# Patient Record
Sex: MALE | Race: OTHER | HISPANIC OR LATINO | URBAN - METROPOLITAN AREA
[De-identification: names, ages, dates, MRNs, and addresses within clinical notes are randomized per-mention and may not be internally consistent; named-entity substitution may affect disease eponyms.]

---

## 2021-10-20 ENCOUNTER — EMERGENCY (EMERGENCY)
Facility: HOSPITAL | Age: 61
LOS: 1 days | Discharge: HOME | End: 2021-10-20
Attending: EMERGENCY MEDICINE
Payer: COMMERCIAL

## 2021-10-20 VITALS
RESPIRATION RATE: 18 BRPM | TEMPERATURE: 99 F | SYSTOLIC BLOOD PRESSURE: 150 MMHG | HEART RATE: 65 BPM | OXYGEN SATURATION: 100 % | DIASTOLIC BLOOD PRESSURE: 75 MMHG

## 2021-10-20 VITALS
HEIGHT: 63 IN | RESPIRATION RATE: 18 BRPM | SYSTOLIC BLOOD PRESSURE: 160 MMHG | TEMPERATURE: 98 F | WEIGHT: 154.98 LBS | OXYGEN SATURATION: 98 % | HEART RATE: 88 BPM | DIASTOLIC BLOOD PRESSURE: 92 MMHG

## 2021-10-20 LAB
ALBUMIN SERPL ELPH-MCNC: 4.3 G/DL — SIGNIFICANT CHANGE UP (ref 3.3–5.2)
ALP SERPL-CCNC: 175 U/L — HIGH (ref 40–120)
ALT FLD-CCNC: SIGNIFICANT CHANGE UP U/L
ANION GAP SERPL CALC-SCNC: 14 MMOL/L — SIGNIFICANT CHANGE UP (ref 5–17)
AST SERPL-CCNC: SIGNIFICANT CHANGE UP U/L
BILIRUB SERPL-MCNC: 0.6 MG/DL — SIGNIFICANT CHANGE UP (ref 0.4–2)
BUN SERPL-MCNC: 17.1 MG/DL — SIGNIFICANT CHANGE UP (ref 8–20)
CALCIUM SERPL-MCNC: 9 MG/DL — SIGNIFICANT CHANGE UP (ref 8.6–10.2)
CHLORIDE SERPL-SCNC: 93 MMOL/L — LOW (ref 98–107)
CO2 SERPL-SCNC: 24 MMOL/L — SIGNIFICANT CHANGE UP (ref 22–29)
CREAT SERPL-MCNC: 0.42 MG/DL — LOW (ref 0.5–1.3)
GLUCOSE SERPL-MCNC: 409 MG/DL — HIGH (ref 70–99)
HCT VFR BLD CALC: 40.5 % — SIGNIFICANT CHANGE UP (ref 39–50)
HGB BLD-MCNC: 14.8 G/DL — SIGNIFICANT CHANGE UP (ref 13–17)
MCHC RBC-ENTMCNC: 28.4 PG — SIGNIFICANT CHANGE UP (ref 27–34)
MCHC RBC-ENTMCNC: 36.5 GM/DL — HIGH (ref 32–36)
MCV RBC AUTO: 77.6 FL — LOW (ref 80–100)
PLATELET # BLD AUTO: 138 K/UL — LOW (ref 150–400)
POTASSIUM SERPL-MCNC: 4.7 MMOL/L — SIGNIFICANT CHANGE UP (ref 3.5–5.3)
POTASSIUM SERPL-SCNC: 4.7 MMOL/L — SIGNIFICANT CHANGE UP (ref 3.5–5.3)
PROT SERPL-MCNC: 7.1 G/DL — SIGNIFICANT CHANGE UP (ref 6.6–8.7)
RBC # BLD: 5.22 M/UL — SIGNIFICANT CHANGE UP (ref 4.2–5.8)
RBC # FLD: 14.6 % — HIGH (ref 10.3–14.5)
SODIUM SERPL-SCNC: 131 MMOL/L — LOW (ref 135–145)
WBC # BLD: 6.67 K/UL — SIGNIFICANT CHANGE UP (ref 3.8–10.5)
WBC # FLD AUTO: 6.67 K/UL — SIGNIFICANT CHANGE UP (ref 3.8–10.5)

## 2021-10-20 PROCEDURE — 99284 EMERGENCY DEPT VISIT MOD MDM: CPT

## 2021-10-20 PROCEDURE — 36415 COLL VENOUS BLD VENIPUNCTURE: CPT

## 2021-10-20 PROCEDURE — T1013: CPT

## 2021-10-20 PROCEDURE — 80053 COMPREHEN METABOLIC PANEL: CPT

## 2021-10-20 PROCEDURE — 85027 COMPLETE CBC AUTOMATED: CPT

## 2021-10-20 PROCEDURE — 82962 GLUCOSE BLOOD TEST: CPT

## 2021-10-20 RX ORDER — SODIUM CHLORIDE 9 MG/ML
2000 INJECTION INTRAMUSCULAR; INTRAVENOUS; SUBCUTANEOUS ONCE
Refills: 0 | Status: DISCONTINUED | OUTPATIENT
Start: 2021-10-20 | End: 2021-10-20

## 2021-10-20 RX ORDER — METFORMIN HYDROCHLORIDE 850 MG/1
1 TABLET ORAL
Qty: 20 | Refills: 0
Start: 2021-10-20 | End: 2021-10-29

## 2021-10-20 RX ORDER — SODIUM CHLORIDE 9 MG/ML
2000 INJECTION, SOLUTION INTRAVENOUS ONCE
Refills: 0 | Status: COMPLETED | OUTPATIENT
Start: 2021-10-20 | End: 2021-10-20

## 2021-10-20 RX ORDER — INSULIN HUMAN 100 [IU]/ML
12 INJECTION, SOLUTION SUBCUTANEOUS ONCE
Refills: 0 | Status: COMPLETED | OUTPATIENT
Start: 2021-10-20 | End: 2021-10-20

## 2021-10-20 RX ADMIN — SODIUM CHLORIDE 4000 MILLILITER(S): 9 INJECTION, SOLUTION INTRAVENOUS at 19:57

## 2021-10-20 RX ADMIN — INSULIN HUMAN 12 UNIT(S): 100 INJECTION, SOLUTION SUBCUTANEOUS at 19:58

## 2021-10-20 NOTE — ED ADULT TRIAGE NOTE - CHIEF COMPLAINT QUOTE
pt with reports of high blood sugar, sent to ED by PMD. reports only symptom is increased thirst and urination. no other complaints, FS HI in triage pt with reports of high blood sugar, sent to ED by PMD. reports only symptoms are increased thirst and urination. denies abdominal pain/nausea/vomiting/fatigue, FS HI in triage

## 2021-10-20 NOTE — ED PROVIDER NOTE - PATIENT PORTAL LINK FT
You can access the FollowMyHealth Patient Portal offered by [facility] by registering at the following website: [email or web address]. By joining [employer]’s FollowMyHealth portal, you will also be able to view your health information using other applications (apps) compatible with our system.

## 2021-10-20 NOTE — ED ADULT NURSE NOTE - CHIEF COMPLAINT QUOTE
pt with reports of high blood sugar, sent to ED by PMD. reports only symptoms are increased thirst and urination. denies abdominal pain/nausea/vomiting/fatigue, FS HI in triage

## 2021-10-20 NOTE — ED PROVIDER NOTE - CLINICAL SUMMARY MEDICAL DECISION MAKING FREE TEXT BOX
ambulating in nad , f/u tomm with pcp dr carr metformin started . not in dka, retrurn precautions given to pt

## 2021-10-20 NOTE — ED ADULT NURSE REASSESSMENT NOTE - NS ED NURSE REASSESS COMMENT FT1
assumed care of pt at 19:30. report received from day shift rn. charting as noted. anox4. rr even and unlabored. able to move extremities well. airway patent. no apparent distress noted. call bell within reach to maintain safety. LR running. awaiting FS and insulin. pt educated on plan of care, pt able to successfully teach back plan of care to RN, RN will continue to reeducate pt during hospital stay.

## 2021-10-20 NOTE — ED ADULT NURSE NOTE - NS_ED_NURSE_TEACHING_TOPIC_ED_A_ED
Labs are normal except the following: Urine shows some protein in the urine but is not high enough to be of concern, the A1c in the blood is elevated at 11.2, vitamin D level low at 20.1.  Continue current medications as prescribed, schedule a follow-up appointment with your endocrinologist as soon as possible to check the insulin pump settings, work on a low carbohydrate, heart healthy eating plan, 30 minutes exercise daily and continue ergocalciferol 50,000 units weekly.  Recheck A1c in 3 months   follow up with pcp

## 2021-10-20 NOTE — ED PROVIDER NOTE - OBJECTIVE STATEMENT
60 y/o male comes in for complaints of polydipsia , polyuria   feels tired rashaad to see PMD stated he has high sugar denies previous diabetes

## 2022-11-07 ENCOUNTER — EMERGENCY (EMERGENCY)
Facility: HOSPITAL | Age: 62
LOS: 1 days | Discharge: HOME | End: 2022-11-07
Attending: EMERGENCY MEDICINE
Payer: COMMERCIAL

## 2022-11-07 VITALS
WEIGHT: 166.89 LBS | DIASTOLIC BLOOD PRESSURE: 81 MMHG | SYSTOLIC BLOOD PRESSURE: 129 MMHG | HEIGHT: 64.96 IN | HEART RATE: 86 BPM | OXYGEN SATURATION: 98 % | RESPIRATION RATE: 20 BRPM | TEMPERATURE: 98 F

## 2022-11-07 LAB
A1C WITH ESTIMATED AVERAGE GLUCOSE RESULT: 12.5 % — HIGH (ref 4–5.6)
ALBUMIN SERPL ELPH-MCNC: 4.7 G/DL — SIGNIFICANT CHANGE UP (ref 3.3–5.2)
ALP SERPL-CCNC: 146 U/L — HIGH (ref 40–120)
ALT FLD-CCNC: 30 U/L — SIGNIFICANT CHANGE UP
ANION GAP SERPL CALC-SCNC: 15 MMOL/L — SIGNIFICANT CHANGE UP (ref 5–17)
APPEARANCE UR: CLEAR — SIGNIFICANT CHANGE UP
AST SERPL-CCNC: 19 U/L — SIGNIFICANT CHANGE UP
B-OH-BUTYR SERPL-SCNC: 2.2 MMOL/L — HIGH
BACTERIA # UR AUTO: NEGATIVE — SIGNIFICANT CHANGE UP
BASE EXCESS BLDV CALC-SCNC: 1.4 MMOL/L — SIGNIFICANT CHANGE UP (ref -2–3)
BASOPHILS # BLD AUTO: 0.03 K/UL — SIGNIFICANT CHANGE UP (ref 0–0.2)
BASOPHILS NFR BLD AUTO: 0.5 % — SIGNIFICANT CHANGE UP (ref 0–2)
BILIRUB SERPL-MCNC: 0.9 MG/DL — SIGNIFICANT CHANGE UP (ref 0.4–2)
BILIRUB UR-MCNC: NEGATIVE — SIGNIFICANT CHANGE UP
BUN SERPL-MCNC: 18.9 MG/DL — SIGNIFICANT CHANGE UP (ref 8–20)
CA-I SERPL-SCNC: 1.13 MMOL/L — LOW (ref 1.15–1.33)
CALCIUM SERPL-MCNC: 9.3 MG/DL — SIGNIFICANT CHANGE UP (ref 8.4–10.5)
CHLORIDE BLDV-SCNC: 100 MMOL/L — SIGNIFICANT CHANGE UP (ref 96–108)
CHLORIDE SERPL-SCNC: 99 MMOL/L — SIGNIFICANT CHANGE UP (ref 96–108)
CO2 SERPL-SCNC: 24 MMOL/L — SIGNIFICANT CHANGE UP (ref 22–29)
COLOR SPEC: YELLOW — SIGNIFICANT CHANGE UP
CREAT SERPL-MCNC: 0.65 MG/DL — SIGNIFICANT CHANGE UP (ref 0.5–1.3)
DIFF PNL FLD: NEGATIVE — SIGNIFICANT CHANGE UP
EGFR: 107 ML/MIN/1.73M2 — SIGNIFICANT CHANGE UP
EOSINOPHIL # BLD AUTO: 0.11 K/UL — SIGNIFICANT CHANGE UP (ref 0–0.5)
EOSINOPHIL NFR BLD AUTO: 1.8 % — SIGNIFICANT CHANGE UP (ref 0–6)
EPI CELLS # UR: SIGNIFICANT CHANGE UP
ESTIMATED AVERAGE GLUCOSE: 312 MG/DL — HIGH (ref 68–114)
GAS PNL BLDV: 136 MMOL/L — SIGNIFICANT CHANGE UP (ref 136–145)
GAS PNL BLDV: SIGNIFICANT CHANGE UP
GLUCOSE BLDC GLUCOMTR-MCNC: 205 MG/DL — HIGH (ref 70–99)
GLUCOSE BLDC GLUCOMTR-MCNC: 268 MG/DL — HIGH (ref 70–99)
GLUCOSE BLDC GLUCOMTR-MCNC: 345 MG/DL — HIGH (ref 70–99)
GLUCOSE BLDC GLUCOMTR-MCNC: 383 MG/DL — HIGH (ref 70–99)
GLUCOSE BLDV-MCNC: 415 MG/DL — HIGH (ref 70–99)
GLUCOSE SERPL-MCNC: 388 MG/DL — HIGH (ref 70–99)
GLUCOSE UR QL: 1000 MG/DL
HCO3 BLDV-SCNC: 26 MMOL/L — SIGNIFICANT CHANGE UP (ref 22–29)
HCT VFR BLD CALC: 43.3 % — SIGNIFICANT CHANGE UP (ref 39–50)
HCT VFR BLDA CALC: 46 % — SIGNIFICANT CHANGE UP
HGB BLD CALC-MCNC: 15.2 G/DL — SIGNIFICANT CHANGE UP (ref 12.6–17.4)
HGB BLD-MCNC: 15.2 G/DL — SIGNIFICANT CHANGE UP (ref 13–17)
IMM GRANULOCYTES NFR BLD AUTO: 0.3 % — SIGNIFICANT CHANGE UP (ref 0–0.9)
KETONES UR-MCNC: ABNORMAL
LACTATE BLDV-MCNC: 1 MMOL/L — SIGNIFICANT CHANGE UP (ref 0.5–2)
LEUKOCYTE ESTERASE UR-ACNC: NEGATIVE — SIGNIFICANT CHANGE UP
LYMPHOCYTES # BLD AUTO: 1.46 K/UL — SIGNIFICANT CHANGE UP (ref 1–3.3)
LYMPHOCYTES # BLD AUTO: 24.4 % — SIGNIFICANT CHANGE UP (ref 13–44)
MAGNESIUM SERPL-MCNC: 2.2 MG/DL — SIGNIFICANT CHANGE UP (ref 1.8–2.6)
MCHC RBC-ENTMCNC: 27.5 PG — SIGNIFICANT CHANGE UP (ref 27–34)
MCHC RBC-ENTMCNC: 35.1 GM/DL — SIGNIFICANT CHANGE UP (ref 32–36)
MCV RBC AUTO: 78.4 FL — LOW (ref 80–100)
MONOCYTES # BLD AUTO: 0.26 K/UL — SIGNIFICANT CHANGE UP (ref 0–0.9)
MONOCYTES NFR BLD AUTO: 4.3 % — SIGNIFICANT CHANGE UP (ref 2–14)
NEUTROPHILS # BLD AUTO: 4.11 K/UL — SIGNIFICANT CHANGE UP (ref 1.8–7.4)
NEUTROPHILS NFR BLD AUTO: 68.7 % — SIGNIFICANT CHANGE UP (ref 43–77)
NITRITE UR-MCNC: NEGATIVE — SIGNIFICANT CHANGE UP
PCO2 BLDV: 41 MMHG — LOW (ref 42–55)
PH BLDV: 7.41 — SIGNIFICANT CHANGE UP (ref 7.32–7.43)
PH UR: 6 — SIGNIFICANT CHANGE UP (ref 5–8)
PHOSPHATE SERPL-MCNC: 3.2 MG/DL — SIGNIFICANT CHANGE UP (ref 2.4–4.7)
PLATELET # BLD AUTO: 156 K/UL — SIGNIFICANT CHANGE UP (ref 150–400)
PO2 BLDV: 135 MMHG — HIGH (ref 25–45)
POTASSIUM BLDV-SCNC: 4.1 MMOL/L — SIGNIFICANT CHANGE UP (ref 3.5–5.1)
POTASSIUM SERPL-MCNC: 4.1 MMOL/L — SIGNIFICANT CHANGE UP (ref 3.5–5.3)
POTASSIUM SERPL-SCNC: 4.1 MMOL/L — SIGNIFICANT CHANGE UP (ref 3.5–5.3)
PROT SERPL-MCNC: 6.9 G/DL — SIGNIFICANT CHANGE UP (ref 6.6–8.7)
PROT UR-MCNC: 15
RBC # BLD: 5.52 M/UL — SIGNIFICANT CHANGE UP (ref 4.2–5.8)
RBC # FLD: 13.3 % — SIGNIFICANT CHANGE UP (ref 10.3–14.5)
RBC CASTS # UR COMP ASSIST: SIGNIFICANT CHANGE UP /HPF (ref 0–4)
SAO2 % BLDV: 98.6 % — SIGNIFICANT CHANGE UP
SODIUM SERPL-SCNC: 138 MMOL/L — SIGNIFICANT CHANGE UP (ref 135–145)
SP GR SPEC: 1.01 — SIGNIFICANT CHANGE UP (ref 1.01–1.02)
UROBILINOGEN FLD QL: NEGATIVE MG/DL — SIGNIFICANT CHANGE UP
WBC # BLD: 5.99 K/UL — SIGNIFICANT CHANGE UP (ref 3.8–10.5)
WBC # FLD AUTO: 5.99 K/UL — SIGNIFICANT CHANGE UP (ref 3.8–10.5)
WBC UR QL: NEGATIVE /HPF — SIGNIFICANT CHANGE UP (ref 0–5)

## 2022-11-07 PROCEDURE — 99218: CPT

## 2022-11-07 PROCEDURE — 93010 ELECTROCARDIOGRAM REPORT: CPT

## 2022-11-07 PROCEDURE — 71045 X-RAY EXAM CHEST 1 VIEW: CPT | Mod: 26

## 2022-11-07 PROCEDURE — 99204 OFFICE O/P NEW MOD 45 MIN: CPT

## 2022-11-07 RX ORDER — DEXTROSE 50 % IN WATER 50 %
12.5 SYRINGE (ML) INTRAVENOUS ONCE
Refills: 0 | Status: DISCONTINUED | OUTPATIENT
Start: 2022-11-07 | End: 2022-11-14

## 2022-11-07 RX ORDER — SODIUM CHLORIDE 9 MG/ML
1000 INJECTION INTRAMUSCULAR; INTRAVENOUS; SUBCUTANEOUS ONCE
Refills: 0 | Status: COMPLETED | OUTPATIENT
Start: 2022-11-07 | End: 2022-11-07

## 2022-11-07 RX ORDER — DEXTROSE 50 % IN WATER 50 %
25 SYRINGE (ML) INTRAVENOUS ONCE
Refills: 0 | Status: DISCONTINUED | OUTPATIENT
Start: 2022-11-07 | End: 2022-11-14

## 2022-11-07 RX ORDER — ASPIRIN/CALCIUM CARB/MAGNESIUM 324 MG
81 TABLET ORAL DAILY
Refills: 0 | Status: DISCONTINUED | OUTPATIENT
Start: 2022-11-07 | End: 2022-11-14

## 2022-11-07 RX ORDER — METFORMIN HYDROCHLORIDE 850 MG/1
500 TABLET ORAL
Refills: 0 | Status: DISCONTINUED | OUTPATIENT
Start: 2022-11-07 | End: 2022-11-14

## 2022-11-07 RX ORDER — INSULIN LISPRO 100/ML
VIAL (ML) SUBCUTANEOUS
Refills: 0 | Status: DISCONTINUED | OUTPATIENT
Start: 2022-11-07 | End: 2022-11-14

## 2022-11-07 RX ORDER — INSULIN HUMAN 100 [IU]/ML
10 INJECTION, SOLUTION SUBCUTANEOUS ONCE
Refills: 0 | Status: COMPLETED | OUTPATIENT
Start: 2022-11-07 | End: 2022-11-07

## 2022-11-07 RX ORDER — EZETIMIBE 10 MG/1
1 TABLET ORAL
Qty: 0 | Refills: 0 | DISCHARGE

## 2022-11-07 RX ORDER — LOSARTAN POTASSIUM 100 MG/1
25 TABLET, FILM COATED ORAL DAILY
Refills: 0 | Status: DISCONTINUED | OUTPATIENT
Start: 2022-11-08 | End: 2022-11-14

## 2022-11-07 RX ORDER — DEXTROSE 50 % IN WATER 50 %
15 SYRINGE (ML) INTRAVENOUS ONCE
Refills: 0 | Status: DISCONTINUED | OUTPATIENT
Start: 2022-11-07 | End: 2022-11-14

## 2022-11-07 RX ORDER — SODIUM CHLORIDE 9 MG/ML
1000 INJECTION, SOLUTION INTRAVENOUS
Refills: 0 | Status: DISCONTINUED | OUTPATIENT
Start: 2022-11-07 | End: 2022-11-14

## 2022-11-07 RX ORDER — INSULIN GLARGINE 100 [IU]/ML
10 INJECTION, SOLUTION SUBCUTANEOUS AT BEDTIME
Refills: 0 | Status: ACTIVE | OUTPATIENT
Start: 2022-11-07 | End: 2023-10-06

## 2022-11-07 RX ORDER — ACETAMINOPHEN 500 MG
650 TABLET ORAL ONCE
Refills: 0 | Status: COMPLETED | OUTPATIENT
Start: 2022-11-07 | End: 2022-11-07

## 2022-11-07 RX ORDER — ASPIRIN/CALCIUM CARB/MAGNESIUM 324 MG
1 TABLET ORAL
Qty: 0 | Refills: 0 | DISCHARGE

## 2022-11-07 RX ORDER — GLUCAGON INJECTION, SOLUTION 0.5 MG/.1ML
1 INJECTION, SOLUTION SUBCUTANEOUS ONCE
Refills: 0 | Status: DISCONTINUED | OUTPATIENT
Start: 2022-11-07 | End: 2022-11-14

## 2022-11-07 RX ORDER — SODIUM CHLORIDE 9 MG/ML
1000 INJECTION, SOLUTION INTRAVENOUS ONCE
Refills: 0 | Status: DISCONTINUED | OUTPATIENT
Start: 2022-11-07 | End: 2022-11-07

## 2022-11-07 RX ORDER — ATORVASTATIN CALCIUM 80 MG/1
80 TABLET, FILM COATED ORAL AT BEDTIME
Refills: 0 | Status: DISCONTINUED | OUTPATIENT
Start: 2022-11-07 | End: 2022-11-14

## 2022-11-07 RX ORDER — ROSUVASTATIN CALCIUM 5 MG/1
1 TABLET ORAL
Qty: 0 | Refills: 0 | DISCHARGE

## 2022-11-07 RX ORDER — ACETAMINOPHEN 500 MG
650 TABLET ORAL EVERY 6 HOURS
Refills: 0 | Status: DISCONTINUED | OUTPATIENT
Start: 2022-11-07 | End: 2022-11-14

## 2022-11-07 RX ORDER — LOSARTAN POTASSIUM 100 MG/1
1 TABLET, FILM COATED ORAL
Qty: 0 | Refills: 0 | DISCHARGE

## 2022-11-07 RX ORDER — INSULIN LISPRO 100/ML
3 VIAL (ML) SUBCUTANEOUS
Refills: 0 | Status: ACTIVE | OUTPATIENT
Start: 2022-11-07 | End: 2023-10-06

## 2022-11-07 RX ORDER — GLIMEPIRIDE 1 MG
1 TABLET ORAL
Qty: 0 | Refills: 0 | DISCHARGE

## 2022-11-07 RX ADMIN — INSULIN GLARGINE 10 UNIT(S): 100 INJECTION, SOLUTION SUBCUTANEOUS at 21:39

## 2022-11-07 RX ADMIN — ATORVASTATIN CALCIUM 80 MILLIGRAM(S): 80 TABLET, FILM COATED ORAL at 21:39

## 2022-11-07 RX ADMIN — INSULIN HUMAN 10 UNIT(S): 100 INJECTION, SOLUTION SUBCUTANEOUS at 11:36

## 2022-11-07 RX ADMIN — Medication 650 MILLIGRAM(S): at 21:39

## 2022-11-07 RX ADMIN — Medication 3 UNIT(S): at 16:48

## 2022-11-07 RX ADMIN — SODIUM CHLORIDE 1000 MILLILITER(S): 9 INJECTION INTRAMUSCULAR; INTRAVENOUS; SUBCUTANEOUS at 11:36

## 2022-11-07 RX ADMIN — Medication 650 MILLIGRAM(S): at 22:39

## 2022-11-07 RX ADMIN — Medication 10: at 16:48

## 2022-11-07 NOTE — ED PROVIDER NOTE - NS ED ROS FT
Gen: No fever, normal PO intake, (+)weight loss, (+) increased thirst  Eyes: No eye irritation or discharge  ENT: No ear pain, no congestion, no rhinorrhea, no sore throat  Resp: No cough, no trouble breathing  Cardiovascular: No chest pain, no palpitation  Gastrointestinal: No nausea, no vomiting, no diarrhea, no constipation  :  (+) increased urine frequency, no dysuria, no hematuria  MS: No joint or muscle pain  Skin: No rashes  Neuro: (+) blurry vision, No headache; no abnormal movements  Remainder negative, except as per the HPI

## 2022-11-07 NOTE — ED PROVIDER NOTE - ATTENDING CONTRIBUTION TO CARE
Tequila MUNOZ- 63 Y/O M with h/o htn, hld dm and recently started on metformin, poor controlled p/w feeling unwell since he drank soda and has nausea, vomiting, increased thirst and urination and came here for high glucose, no chest pain, headache. ED  NOHEMY    Pt is alert, well appearing male, S1S2 normal reg, b/l clear breath sounds, abd soft,  nt, nd, neuro exam aox3, cn 2-12 intact, no focal deficits, skin warm, dry, good turgor    plan to control glucose r/o dka, hhs, likely obs for insulin start and endo consult

## 2022-11-07 NOTE — ED CDU PROVIDER INITIAL DAY NOTE - OBJECTIVE STATEMENT
62M who was diagnosed with DM 1yr ago in ED and started on metformin 500 qd presenting with increased thirst, fatigue, blurry vision and wt loss x 2 weeks. Labs significant for uncontrolled DM.

## 2022-11-07 NOTE — ED CDU PROVIDER INITIAL DAY NOTE - ATTENDING APP SHARED VISIT CONTRIBUTION OF CARE
type 2 diabetic not maintaining healthy glucose levels on oral meds  will place in obs for hyperglycemic/DM protocol  agree w care plan

## 2022-11-07 NOTE — ED ADULT TRIAGE NOTE - CHIEF COMPLAINT QUOTE
Pt arrives to ED c/o high blood sugars for two weeks experiencing symptoms - frequency urinating and thirst . HAs not been checking his blood sugar " I don't have  glucometer at home " States he is complaint with diabetic medications

## 2022-11-07 NOTE — ED PROVIDER NOTE - OBJECTIVE STATEMENT
62 year old male with PMHx NIDDM, HTN, HLD presenting with increased urinary frequency, increased thirst, 17lb (185-->168) weight loss, generalized weakness, and blurry vision over last 2 weeks. States the last time this happened he came to ED and was diagnosed with DM. Takes metformin nightly, states is compliant with medications. Does not check his blood sugar, does not own a glucometer. Has PCP f/u on 11/20. Denies any focal weakness, numbness, tingling, chest pain, difficulty breathing, headache, fevers, chills, cough, dysuria, hematuria, abdominal pain, nausea, vomiting, or diarrhea.

## 2022-11-07 NOTE — ED CDU PROVIDER INITIAL DAY NOTE - MEDICAL DECISION MAKING DETAILS
Uncontrolled DM:  -A1c 12.5  -insulin sliding scale   -endocrine to see   -insulin teaching     HLD:  home med    observation for insulin initiation and education

## 2022-11-07 NOTE — ED PROVIDER NOTE - PROGRESS NOTE DETAILS
A1C 12.5, pH 7.4, HcO3 24, anion gap 15. Patient with uncontrolled DM. Will give 10units insulin, admit to observation and consult endocrinology for further evaluation/management of DM requiring insulin.     Reviewed all results with patient as well as plans for admission to observation. Patient is comfortable with plan. Questions answered.

## 2022-11-07 NOTE — CONSULT NOTE ADULT - ASSESSMENT
62 year old male with uncontrolled T2DM with symptoms of hyperglycemia, no DKA on labs but A1c 12.5%  - start basal/bolus insulin therapy - Lantus 10 units at bedtime, Admelog 3 units premeals, Admelog scale  - will need insulin teaching in ER prior to discharge  - dietary education while in ER  - can f/u with PCP or our office as outpt  - ok to continue metformin on discharge  - pt should also start/continue monitoring sugars at time    Also with HLD - cont statin

## 2022-11-07 NOTE — ED PROVIDER NOTE - CLINICAL SUMMARY MEDICAL DECISION MAKING FREE TEXT BOX
62 year old male with increased urinary freuqnecy, thirst, weight loss, blurry vision, generalized weakness x 2 weeks. FSBS 400s here. Symptoms likely 2/2 poorly controlled diabetes, however will r/o DKA, hydrate with LR, reassess.

## 2022-11-07 NOTE — CONSULT NOTE ADULT - SUBJECTIVE AND OBJECTIVE BOX
HPI: Hx via Interpretor   62 year old male with PMHx NIDDM, HTN, HLD presenting with increased urinary frequency, increased thirst, 17lb (185-->168) weight loss, generalized weakness, and blurry vision over last 2 weeks. States the last time this happened he came to ED and was diagnosed with DM. Takes metformin nightly, states is compliant with medications. Does not check his blood sugar, does not own a glucometer. Has PCP f/u on 11/20.     ROS: Denies any focal weakness, numbness, tingling, chest pain, difficulty breathing, headache, fevers, chills, cough, dysuria, hematuria, abdominal pain, nausea, vomiting, or diarrhea.    PAST MEDICAL & SURGICAL HISTORY:  DM (diabetes mellitus)  HLD (hyperlipidemia)    FAMILY HISTORY: Mom w PASCALE    SOCIAL HISTORY: denies tobacco, illicit drugs or EtOh use    MEDICATIONS  (STANDING):  aspirin enteric coated 81 milliGRAM(s) Oral daily  atorvastatin 80 milliGRAM(s) Oral at bedtime  dextrose 5%. 1000 milliLiter(s) (100 mL/Hr) IV Continuous <Continuous>  dextrose 5%. 1000 milliLiter(s) (50 mL/Hr) IV Continuous <Continuous>  dextrose 50% Injectable 25 Gram(s) IV Push once  dextrose 50% Injectable 12.5 Gram(s) IV Push once  dextrose 50% Injectable 25 Gram(s) IV Push once  glucagon  Injectable 1 milliGRAM(s) IntraMuscular once  insulin glargine Injectable (LANTUS) 10 Unit(s) SubCutaneous at bedtime  insulin lispro (ADMELOG) corrective regimen sliding scale   SubCutaneous three times a day before meals  insulin lispro Injectable (ADMELOG) 3 Unit(s) SubCutaneous three times a day with meals    MEDICATIONS  (PRN):  dextrose Oral Gel 15 Gram(s) Oral once PRN Blood Glucose LESS THAN 70 milliGRAM(s)/deciliter    ALLERGIES: No Known Allergies    Vital Signs Last 24 Hrs  T(C): 36.7 (07 Nov 2022 14:42), Max: 36.7 (07 Nov 2022 08:28)  T(F): 98.1 (07 Nov 2022 14:42), Max: 98.1 (07 Nov 2022 14:42)  HR: 72 (07 Nov 2022 14:42) (72 - 86)  BP: 129/79 (07 Nov 2022 14:42) (129/79 - 129/81)  BP(mean): --  RR: 19 (07 Nov 2022 14:42) (19 - 20)  SpO2: 98% (07 Nov 2022 14:42) (98% - 98%)    Parameters below as of 07 Nov 2022 14:42  Patient On (Oxygen Delivery Method): room air    Physical Exam:  General appearance: Well developed, well nourished.  Eyes: Pupils equal. EOMI  Lungs: Normal respiratory excursion. Lungs clear no w/r/r  CV: Normal S1S2, regular. No m/r/g.   Abdomen: Soft, nontender, nondistended, (+) BS  Musculoskeletal: No cyanosis, clubbing, or edema.  Skin: Warm and moist.  Neuro: Cranial nerves intact.   Psych: Normal affect, good judgement/insight      LABS:                        15.2   5.99  )-----------( 156      ( 07 Nov 2022 09:45 )             43.3     11-07    138  |  99  |  18.9  ----------------------------<  388<H>  4.1   |  24.0  |  0.65    Ca    9.3      07 Nov 2022 09:45  Phos  3.2     11-07  Mg     2.2     11-07    TPro  6.9  /  Alb  4.7  /  TBili  0.9  /  DBili  x   /  AST  19  /  ALT  30  /  AlkPhos  146<H>  11-07    LIVER FUNCTIONS - ( 07 Nov 2022 09:45 )  Alb: 4.7 g/dL / Pro: 6.9 g/dL / ALK PHOS: 146 U/L / ALT: 30 U/L / AST: 19 U/L / GGT: x             A1C with Estimated Average Glucose Result: 12.5 % (11-07-22 @ 09:45)      CAPILLARY BLOOD GLUCOSE  POCT Blood Glucose.: 268 mg/dL (07 Nov 2022 13:51)  POCT Blood Glucose.: 345 mg/dL (07 Nov 2022 11:34)  POCT Blood Glucose.: 405 mg/dL (07 Nov 2022 08:33)

## 2022-11-07 NOTE — ED PROVIDER NOTE - PHYSICAL EXAMINATION
Gen: well appearing, no acute distress  Head: normocephalic, atraumatic  EENT: EOMI, PERRLA, neck supple, dry mucous membranes, no scleral icterus  Lung: no increased work of breathing, clear to auscultation bilaterally, no wheezing, rales, rhonchi, speaking in full sentences  CV: regular rate, regular rhythm, normal s1/s2, 2+ radial pulses bilaterally  Abd: soft, non-tender, non-distended, no rebound tenderness or guarding; no CVA tenderness  MSK: No edema, no visible deformities, full range of motion in all 4 extremities  Neuro: CNII-XII intact, UE: 5/5 strength throughtout b/l, LE: 5/5 strength throughout b/l, no dysmetria on FTN or HTS, normal sensation intact b/l, Romberg negative, gait non-ataxic   Skin: No rash, no lesions, no jaundice  Psych: normal affect, normal speech

## 2022-11-07 NOTE — ADVANCED PRACTICE NURSE CONSULT - RECOMMEDATIONS
after chart review pls consider lantus 10 units qhs and admelog 3 units before meals + moderate ss  inuslin pen teaching  glucose meter teaching

## 2022-11-08 VITALS
OXYGEN SATURATION: 97 % | RESPIRATION RATE: 18 BRPM | DIASTOLIC BLOOD PRESSURE: 75 MMHG | TEMPERATURE: 98 F | SYSTOLIC BLOOD PRESSURE: 120 MMHG | HEART RATE: 76 BPM

## 2022-11-08 PROBLEM — Z00.00 ENCOUNTER FOR PREVENTIVE HEALTH EXAMINATION: Status: ACTIVE | Noted: 2022-11-08

## 2022-11-08 LAB
GLUCOSE BLDC GLUCOMTR-MCNC: 314 MG/DL — HIGH (ref 70–99)
GLUCOSE BLDC GLUCOMTR-MCNC: 316 MG/DL — HIGH (ref 70–99)

## 2022-11-08 PROCEDURE — 93005 ELECTROCARDIOGRAM TRACING: CPT

## 2022-11-08 PROCEDURE — 82962 GLUCOSE BLOOD TEST: CPT

## 2022-11-08 PROCEDURE — 82010 KETONE BODYS QUAN: CPT

## 2022-11-08 PROCEDURE — 85018 HEMOGLOBIN: CPT

## 2022-11-08 PROCEDURE — 80053 COMPREHEN METABOLIC PANEL: CPT

## 2022-11-08 PROCEDURE — 82330 ASSAY OF CALCIUM: CPT

## 2022-11-08 PROCEDURE — 82803 BLOOD GASES ANY COMBINATION: CPT

## 2022-11-08 PROCEDURE — 84100 ASSAY OF PHOSPHORUS: CPT

## 2022-11-08 PROCEDURE — 83605 ASSAY OF LACTIC ACID: CPT

## 2022-11-08 PROCEDURE — 84484 ASSAY OF TROPONIN QUANT: CPT

## 2022-11-08 PROCEDURE — 99283 EMERGENCY DEPT VISIT LOW MDM: CPT | Mod: 25

## 2022-11-08 PROCEDURE — 81001 URINALYSIS AUTO W/SCOPE: CPT

## 2022-11-08 PROCEDURE — 83036 HEMOGLOBIN GLYCOSYLATED A1C: CPT

## 2022-11-08 PROCEDURE — 85025 COMPLETE CBC W/AUTO DIFF WBC: CPT

## 2022-11-08 PROCEDURE — 82947 ASSAY GLUCOSE BLOOD QUANT: CPT

## 2022-11-08 PROCEDURE — 84132 ASSAY OF SERUM POTASSIUM: CPT

## 2022-11-08 PROCEDURE — 99214 OFFICE O/P EST MOD 30 MIN: CPT

## 2022-11-08 PROCEDURE — 99217: CPT

## 2022-11-08 PROCEDURE — 83930 ASSAY OF BLOOD OSMOLALITY: CPT

## 2022-11-08 PROCEDURE — 84295 ASSAY OF SERUM SODIUM: CPT

## 2022-11-08 PROCEDURE — 36415 COLL VENOUS BLD VENIPUNCTURE: CPT

## 2022-11-08 PROCEDURE — 85014 HEMATOCRIT: CPT

## 2022-11-08 PROCEDURE — 71045 X-RAY EXAM CHEST 1 VIEW: CPT

## 2022-11-08 PROCEDURE — 83735 ASSAY OF MAGNESIUM: CPT

## 2022-11-08 PROCEDURE — 82435 ASSAY OF BLOOD CHLORIDE: CPT

## 2022-11-08 PROCEDURE — G0378: CPT

## 2022-11-08 RX ORDER — INSULIN LISPRO 100/ML
3 VIAL (ML) SUBCUTANEOUS
Qty: 4 | Refills: 0
Start: 2022-11-08 | End: 2022-12-07

## 2022-11-08 RX ORDER — INSULIN GLARGINE 100 [IU]/ML
15 INJECTION, SOLUTION SUBCUTANEOUS
Qty: 4 | Refills: 0
Start: 2022-11-08 | End: 2022-12-07

## 2022-11-08 RX ORDER — METFORMIN HYDROCHLORIDE 850 MG/1
1 TABLET ORAL
Qty: 60 | Refills: 0
Start: 2022-11-08 | End: 2022-12-07

## 2022-11-08 RX ORDER — INSULIN GLARGINE 100 [IU]/ML
10 INJECTION, SOLUTION SUBCUTANEOUS
Qty: 3 | Refills: 0
Start: 2022-11-08 | End: 2022-12-07

## 2022-11-08 RX ORDER — INSULIN NPH HUM/REG INSULIN HM 70-30/ML
15 VIAL (ML) SUBCUTANEOUS
Qty: 15 | Refills: 0
Start: 2022-11-08 | End: 2022-12-07

## 2022-11-08 RX ORDER — INSULIN GLARGINE 100 [IU]/ML
14 INJECTION, SOLUTION SUBCUTANEOUS AT BEDTIME
Refills: 0 | Status: DISCONTINUED | OUTPATIENT
Start: 2022-11-08 | End: 2022-11-14

## 2022-11-08 RX ORDER — INSULIN LISPRO 100/ML
5 VIAL (ML) SUBCUTANEOUS
Qty: 5 | Refills: 0
Start: 2022-11-08 | End: 2022-12-07

## 2022-11-08 RX ORDER — ISOPROPYL ALCOHOL, BENZOCAINE .7; .06 ML/ML; ML/ML
1 SWAB TOPICAL
Qty: 120 | Refills: 0
Start: 2022-11-08 | End: 2022-12-07

## 2022-11-08 RX ORDER — INSULIN LISPRO 100/ML
6 VIAL (ML) SUBCUTANEOUS
Refills: 0 | Status: DISCONTINUED | OUTPATIENT
Start: 2022-11-08 | End: 2022-11-14

## 2022-11-08 RX ADMIN — METFORMIN HYDROCHLORIDE 500 MILLIGRAM(S): 850 TABLET ORAL at 05:23

## 2022-11-08 RX ADMIN — Medication 3 UNIT(S): at 08:03

## 2022-11-08 RX ADMIN — Medication 8: at 08:04

## 2022-11-08 RX ADMIN — LOSARTAN POTASSIUM 25 MILLIGRAM(S): 100 TABLET, FILM COATED ORAL at 05:23

## 2022-11-08 RX ADMIN — Medication 81 MILLIGRAM(S): at 08:04

## 2022-11-08 NOTE — PROGRESS NOTE ADULT - NS ATTEND AMEND GEN_ALL_CORE FT
poorly controlled T2DM requiring initiation of insulin therapy, insulin dosing as above, strongly advised outpt follow up for diabetes management

## 2022-11-08 NOTE — PROGRESS NOTE ADULT - ASSESSMENT
62 year old male with uncontrolled T2DM with hyperglycemia, a1c 12.5%    1. Uncontrolled T2DM, a1c 12.5%  - Increase lantus to 14 units qhs  - Increase admelog to 6 units TID with meals  - Will have to be discharged on 70/30 insulin 15 units BID and metformin 500mg BID  - Has insurance but does cover prescriptions  - Has f/u endocrine jasmyn: 12/14 @ 8:30am in [place] office  - Met with CDE for insulin education    2. HLD - Continue statin

## 2022-11-08 NOTE — ADVANCED PRACTICE NURSE CONSULT - REASON FOR CONSULT
[facility]

## 2022-11-08 NOTE — ED CDU PROVIDER DISPOSITION NOTE - CLINICAL COURSE
62M who was diagnosed with DM 1yr ago in ED and started on metformin 500 qd presenting with increased thirst, fatigue, blurry vision and wt loss x 2 weeks found to be hyperglycemic and A1C 12.5. Pt seen by endocrinology who recommended starting insulin. Glucose trending down. Rx sent for Novolin 70/30 15U bid. Pt taught to inject and educated on appropriate diabetic diet. Now stable for discharge.

## 2022-11-08 NOTE — ED CDU PROVIDER DISPOSITION NOTE - PATIENT PORTAL LINK FT
You can access the FollowMyHealth Patient Portal offered by [facility] by registering at the following website: [email or web address]. By joining [employer]’s FollowMyHealth portal, you will also be able to view your health information using other applications (apps) compatible with our system.

## 2022-11-08 NOTE — ED CDU PROVIDER DISPOSITION NOTE - NSFOLLOWUPINSTRUCTIONS_ED_ALL_ED_FT
Follow up with endocrinologist for your appointment     December 14, 2022 at 8:30am   Diabetes and endocrinology at [facility]   [address], [place] [address]    Check your blood sugar at least twice a day and monitor for signs of low blood sugar. Take food or juice immediately if you feel symptoms of low blood sugar.   Return to ED for any worsening symptoms.     Hyperglycemia    Hyperglycemia occurs when the glucose (sugar) level in your blood is too high. Symptoms include increased urination, increased thirst, a dry mouth, or changes in appetite. If started on a medication, take exactly as prescribed by your health care professional. Maintain a healthy lifestyle and follow up with your primary care physician.    SEEK IMMEDIATE MEDICAL CARE IF YOU HAVE ANY OF THE FOLLOWING SYMPTOMS: shortness of breath, change in mental status, nausea or vomiting, fruity smell to your breath, or any signs of dehydration.

## 2022-11-08 NOTE — ED CDU PROVIDER SUBSEQUENT DAY NOTE - ATTENDING APP SHARED VISIT CONTRIBUTION OF CARE
Seen on rounds with  (Anthony).  Reports no complaints.  Reeducated on diabetes management.  Anticipated  discharge later this morning after demonstrating appropriate use of fingerstick checks and insulin injections

## 2022-11-08 NOTE — ADVANCED PRACTICE NURSE CONSULT - RECOMMEDATIONS
continue diabetes self management education  pt to draw and inject all insulin doses while in the hospital using insulin syringe and rn supervision   all meds to vivo inorder to keep cost low.  glucometer strips and lancets for 3-4xdaily

## 2022-11-08 NOTE — PROGRESS NOTE ADULT - SUBJECTIVE AND OBJECTIVE BOX
INTERVAL EVENTS:  Follow up diabetes management    ROS: Patient denies chest pain, SOB, abd pain, N/V.    MEDICATIONS  (STANDING):  aspirin enteric coated 81 milliGRAM(s) Oral daily  atorvastatin 80 milliGRAM(s) Oral at bedtime  dextrose 5%. 1000 milliLiter(s) (100 mL/Hr) IV Continuous <Continuous>  dextrose 5%. 1000 milliLiter(s) (50 mL/Hr) IV Continuous <Continuous>  dextrose 50% Injectable 25 Gram(s) IV Push once  dextrose 50% Injectable 12.5 Gram(s) IV Push once  dextrose 50% Injectable 25 Gram(s) IV Push once  glucagon  Injectable 1 milliGRAM(s) IntraMuscular once  insulin glargine Injectable (LANTUS) 14 Unit(s) SubCutaneous at bedtime  insulin lispro (ADMELOG) corrective regimen sliding scale   SubCutaneous three times a day before meals  insulin lispro Injectable (ADMELOG) 6 Unit(s) SubCutaneous three times a day before meals  losartan 25 milliGRAM(s) Oral daily  metFORMIN 500 milliGRAM(s) Oral two times a day    MEDICATIONS  (PRN):  acetaminophen     Tablet .. 650 milliGRAM(s) Oral every 6 hours PRN Severe Pain (7 - 10)  dextrose Oral Gel 15 Gram(s) Oral once PRN Blood Glucose LESS THAN 70 milliGRAM(s)/deciliter    Allergies  No Known Allergies    Vital Signs Last 24 Hrs  T(C): 37.1 (2022 07:37), Max: 37.1 (2022 07:37)  T(F): 98.8 (2022 07:37), Max: 98.8 (2022 07:37)  HR: 69 (2022 07:37) (69 - 87)  BP: 128/74 (2022 07:37) (106/66 - 145/81)  BP(mean): --  RR: 18 (2022 07:37) (16 - 19)  SpO2: 97% (2022 07:37) (95% - 98%)    Parameters below as of 2022 09:54  Patient On (Oxygen Delivery Method): room air      PHYSICAL EXAM:  General: No apparent distress  Neck: Supple, trachea midline, no thyromegaly  Respiratory: Lungs clear bilaterally, normal rate, effort  Cardiac: +S1, S2, no m/r/g  GI: +BS, soft, non tender, non distended  Extremities: No peripheral edema, no pedal lesions  Neuro: A+O X3, no tremor  Pysch: Affect appropriate   Skin: No acanthosis       LABS:                        15.2   5.99  )-----------( 156      ( 2022 09:45 )             43.3         138  |  99  |  18.9  ----------------------------<  388<H>  4.1   |  24.0  |  0.65    Ca    9.3      2022 09:45  Phos  3.2       Mg     2.2         TPro  6.9  /  Alb  4.7  /  TBili  0.9  /  DBili  x   /  AST  19  /  ALT  30  /  AlkPhos  146<H>      Urinalysis Basic - ( 2022 09:50 )    Color: Yellow / Appearance: Clear / S.015 / pH: x  Gluc: x / Ketone: Moderate  / Bili: Negative / Urobili: Negative mg/dL   Blood: x / Protein: 15 / Nitrite: Negative   Leuk Esterase: Negative / RBC: 0-2 /HPF / WBC Negative /HPF   Sq Epi: x / Non Sq Epi: Occasional / Bacteria: Negative          POCT Blood Glucose.: 316 mg/dL (22 @ 11:23)  POCT Blood Glucose.: 314 mg/dL (22 @ 07:50)  POCT Blood Glucose.: 205 mg/dL (22 @ 21:35)  POCT Blood Glucose.: 383 mg/dL (22 @ 16:09)  POCT Blood Glucose.: 268 mg/dL (22 @ 13:51)  POCT Blood Glucose.: 345 mg/dL (22 @ 11:34)         INTERVAL EVENTS:  Follow up diabetes management   used #[id number]    ROS: Patient denies chest pain, SOB, abd pain, N/V.    MEDICATIONS  (STANDING):  aspirin enteric coated 81 milliGRAM(s) Oral daily  atorvastatin 80 milliGRAM(s) Oral at bedtime  dextrose 5%. 1000 milliLiter(s) (100 mL/Hr) IV Continuous <Continuous>  dextrose 5%. 1000 milliLiter(s) (50 mL/Hr) IV Continuous <Continuous>  dextrose 50% Injectable 25 Gram(s) IV Push once  dextrose 50% Injectable 12.5 Gram(s) IV Push once  dextrose 50% Injectable 25 Gram(s) IV Push once  glucagon  Injectable 1 milliGRAM(s) IntraMuscular once  insulin glargine Injectable (LANTUS) 14 Unit(s) SubCutaneous at bedtime  insulin lispro (ADMELOG) corrective regimen sliding scale   SubCutaneous three times a day before meals  insulin lispro Injectable (ADMELOG) 6 Unit(s) SubCutaneous three times a day before meals  losartan 25 milliGRAM(s) Oral daily  metFORMIN 500 milliGRAM(s) Oral two times a day    MEDICATIONS  (PRN):  acetaminophen     Tablet .. 650 milliGRAM(s) Oral every 6 hours PRN Severe Pain (7 - 10)  dextrose Oral Gel 15 Gram(s) Oral once PRN Blood Glucose LESS THAN 70 milliGRAM(s)/deciliter    Allergies  No Known Allergies    Vital Signs Last 24 Hrs  T(C): 37.1 (2022 07:37), Max: 37.1 (2022 07:37)  T(F): 98.8 (2022 07:37), Max: 98.8 (2022 07:37)  HR: 69 (2022 07:37) (69 - 87)  BP: 128/74 (2022 07:37) (106/66 - 145/81)  BP(mean): --  RR: 18 (2022 07:37) (16 - 19)  SpO2: 97% (2022 07:37) (95% - 98%)    Parameters below as of 2022 09:54  Patient On (Oxygen Delivery Method): room air      PHYSICAL EXAM:  General: No apparent distress  Neck: Supple, trachea midline, no thyromegaly  Respiratory: Lungs clear bilaterally, normal rate, effort  Cardiac: +S1, S2, no m/r/g  GI: +BS, soft, non tender, non distended  Extremities: No peripheral edema, no pedal lesions  Neuro: A+O X3, no tremor  Pysch: Affect appropriate   Skin: No acanthosis       LABS:                        15.2   5.99  )-----------( 156      ( 2022 09:45 )             43.3         138  |  99  |  18.9  ----------------------------<  388<H>  4.1   |  24.0  |  0.65    Ca    9.3      2022 09:45  Phos  3.2       Mg     2.2         TPro  6.9  /  Alb  4.7  /  TBili  0.9  /  DBili  x   /  AST  19  /  ALT  30  /  AlkPhos  146<H>      Urinalysis Basic - ( 2022 09:50 )    Color: Yellow / Appearance: Clear / S.015 / pH: x  Gluc: x / Ketone: Moderate  / Bili: Negative / Urobili: Negative mg/dL   Blood: x / Protein: 15 / Nitrite: Negative   Leuk Esterase: Negative / RBC: 0-2 /HPF / WBC Negative /HPF   Sq Epi: x / Non Sq Epi: Occasional / Bacteria: Negative          POCT Blood Glucose.: 316 mg/dL (22 @ 11:23)  POCT Blood Glucose.: 314 mg/dL (22 @ 07:50)  POCT Blood Glucose.: 205 mg/dL (22 @ 21:35)  POCT Blood Glucose.: 383 mg/dL (22 @ 16:09)  POCT Blood Glucose.: 268 mg/dL (22 @ 13:51)  POCT Blood Glucose.: 345 mg/dL (22 @ 11:34)         INTERVAL EVENTS:  Follow up diabetes management   used #[id number]    ROS: Patient denies chest pain, SOB, abd pain, N/V. polydipsia/polyuria improved.    MEDICATIONS  (STANDING):  aspirin enteric coated 81 milliGRAM(s) Oral daily  atorvastatin 80 milliGRAM(s) Oral at bedtime  dextrose 5%. 1000 milliLiter(s) (100 mL/Hr) IV Continuous <Continuous>  dextrose 5%. 1000 milliLiter(s) (50 mL/Hr) IV Continuous <Continuous>  dextrose 50% Injectable 25 Gram(s) IV Push once  dextrose 50% Injectable 12.5 Gram(s) IV Push once  dextrose 50% Injectable 25 Gram(s) IV Push once  glucagon  Injectable 1 milliGRAM(s) IntraMuscular once  insulin glargine Injectable (LANTUS) 14 Unit(s) SubCutaneous at bedtime  insulin lispro (ADMELOG) corrective regimen sliding scale   SubCutaneous three times a day before meals  insulin lispro Injectable (ADMELOG) 6 Unit(s) SubCutaneous three times a day before meals  losartan 25 milliGRAM(s) Oral daily  metFORMIN 500 milliGRAM(s) Oral two times a day    MEDICATIONS  (PRN):  acetaminophen     Tablet .. 650 milliGRAM(s) Oral every 6 hours PRN Severe Pain (7 - 10)  dextrose Oral Gel 15 Gram(s) Oral once PRN Blood Glucose LESS THAN 70 milliGRAM(s)/deciliter    Allergies  No Known Allergies    Vital Signs Last 24 Hrs  T(C): 37.1 (2022 07:37), Max: 37.1 (2022 07:37)  T(F): 98.8 (2022 07:37), Max: 98.8 (2022 07:37)  HR: 69 (2022 07:37) (69 - 87)  BP: 128/74 (2022 07:37) (106/66 - 145/81)  BP(mean): --  RR: 18 (2022 07:37) (16 - 19)  SpO2: 97% (2022 07:37) (95% - 98%)    Parameters below as of 2022 09:54  Patient On (Oxygen Delivery Method): room air      PHYSICAL EXAM:  General: No apparent distress  Neck: Supple, trachea midline, no thyromegaly  Respiratory: Lungs clear bilaterally, normal rate, effort  Cardiac: +S1, S2, no m/r/g  GI: +BS, soft, non tender, non distended  Extremities: No peripheral edema, no pedal lesions  Neuro: A+O X3, no tremor  Pysch: Affect appropriate   Skin: No acanthosis       LABS:                        15.2   5.99  )-----------( 156      ( 2022 09:45 )             43.3         138  |  99  |  18.9  ----------------------------<  388<H>  4.1   |  24.0  |  0.65    Ca    9.3      2022 09:45  Phos  3.2       Mg     2.2         TPro  6.9  /  Alb  4.7  /  TBili  0.9  /  DBili  x   /  AST  19  /  ALT  30  /  AlkPhos  146<H>      Urinalysis Basic - ( 2022 09:50 )    Color: Yellow / Appearance: Clear / S.015 / pH: x  Gluc: x / Ketone: Moderate  / Bili: Negative / Urobili: Negative mg/dL   Blood: x / Protein: 15 / Nitrite: Negative   Leuk Esterase: Negative / RBC: 0-2 /HPF / WBC Negative /HPF   Sq Epi: x / Non Sq Epi: Occasional / Bacteria: Negative          POCT Blood Glucose.: 316 mg/dL (22 @ 11:23)  POCT Blood Glucose.: 314 mg/dL (22 @ 07:50)  POCT Blood Glucose.: 205 mg/dL (22 @ 21:35)  POCT Blood Glucose.: 383 mg/dL (22 @ 16:09)  POCT Blood Glucose.: 268 mg/dL (22 @ 13:51)  POCT Blood Glucose.: 345 mg/dL (22 @ 11:34)

## 2022-11-08 NOTE — ADVANCED PRACTICE NURSE CONSULT - ASSESSMENT
went to see pt in am pt is a+ox3 c/o 0 pain speaks Indonesian. rn is assisting in language. pt states he helped a friend injecting insulin, he was able to return demonstration of insulin drawing and injection successful w some prompting. he was educated about the improtance of adding insulin to regimen and was reminded to take his metformin as prescribed. pt has a fu appointment w endocrine in [place] on december 14, 2022.

## 2022-11-08 NOTE — ED ADULT NURSE REASSESSMENT NOTE - NS ED NURSE REASSESS COMMENT FT1
Assumed care of pt 11:25 Pt seen resting comfortably on stretcher, A&Ox4 OOB self. No distress noted. Repeat sugar to be taken. Will continue to monitor.
Patient demonstrated proper technique of self injecting insulin. Diabetic diet reviewed. Diabetic teaching continued. Questions answered.
Patient received at 0700, awake, alert and orientated x3, oob with minimal assistance needed and walking about the unit. Pt is aware of plan of care and possible dc today. Pt was taught how to self inject insulin and check his own bp. extensive Diabetes education provided on symtpoms of hypo and hyergylcemia and diet control
pt resting comfortably on stretcher with eyes closed. nad, rounds frequently provided for pt comfort and safety. awaiting more diabetes/insulin education.
pt resting comfortably on stretcher with eyes closed. nad, rounds frequently provided for pt comfort and safety. pt awaiting continuation on dm/insulin edu.
pt received from Bren GARCIA at 1930. no s/s of acute distress. pt c/o of generalized pain, medicated as per orders. no open wounds noted on pt. ED  Ajit at bedside to assist RN on diabetes education in priory language. pt educated on how to check blood sugar via fingerstick, how to administer insulin subcutaneously, the difference between fast-acting/long-acting insulin and the risk that may happen if the incorrect insulin/ incorrect dose of insulin is administered. RN also educated pt on s/s of hypoglycemia and hyperglycemia. pt able to verbalized understanding, demonstrate technique on how to check blood sugar/ administer insulin, aware of difference between fast-acting/long-acting insulin, and recognize the s/s of hypo/hyperglycemia. pt able to make needs known. call bell in reach and is encouraged to use when assistance is needed. pt awaiting continuation of diabetes/insulin education.

## 2022-11-08 NOTE — ED CDU PROVIDER DISPOSITION NOTE - ATTENDING CONTRIBUTION TO CARE
Placed on observation for management of hyperglycemia.  Seen by endocrinology and prescribed Lantus along with Premeal Admelog.  Seen by diabetes educator and insulin adjustments advised.  Educated by nurse on diabetes management.  Patient to follow-up with PMD and endocrinology as an outpatient

## 2022-11-15 NOTE — ED ADULT NURSE NOTE - PRO INTERPRETER NEED 2
Physical Therapy Visit    Visit Type: Daily Treatment Note  Visit: 14  Referring Provider: Reginald Munguia MD  Medical Diagnosis (from order): Diagnosis Information    Diagnosis  V45.89, V15.51 (ICD-9-CM) - Z98.890, Z87.81 (ICD-10-CM) - S/P ORIF (open reduction internal fixation) fracture  V54.16 (ICD-9-CM) - S82.122D (ICD-10-CM) - Closed displaced fracture of lateral condyle of left tibia with routine healing, subsequent encounter         SUBJECTIVE                                                                                                               Patient has been working on rep standing ext with OP - no major change from last visit.      OBJECTIVE                                                                                                                     Range of Motion (ROM)   (degrees unless noted; active unless noted; norms in ( ); negative=lacking to 0, positive=beyond 0)  Knee:   - Flexion (150):      • Left:  112    - Extension (0-10):      • Left:  -6                        Treatment     Therapeutic Exercise  Bike x4min, level 1>4  SLR 10x L  SLR rainbow 10x L  Side lying SLR 2x10 L  Standing calf stretch 38c2grm L  Weight shift with glute/quad set 10z4zga L  Walking with glute/quad set x150'  Power tower @35deg 10x squat, 10x 80/20 L squat  Power tower @35deg heel raise 10x  CW/CCW circles on dot 10x/direction    Skilled input: verbal instruction/cues and tactile instruction/cues    Writer verbally educated and received verbal consent for hand placement, positioning of patient, and techniques to be performed today from patient for clothing adjustments for techniques, therapist position for techniques and hand placement and palpation for techniques as described above and how they are pertinent to the patient's plan of care.    Home Exercise Program  Access Code: [email or web address]  URL: [email or web address]  Date: 11/10/2022  Prepared by: Frida  Lowry    Exercises  · Standing Quad Set - 10 reps  · Squat with Chair Touch - 2 x daily - 10 reps  · Standing Hip Abduction with Counter Support - 2 x daily - 10 reps      ASSESSMENT                                                                                                            Patient continues to have trunk flex and trendelenburg with walking so focused on standing glute and quad set. She is able to perform in B stance but with weight shift and RLE lift, she loses neuromuscular control. When focusing on this, she is able to walk more upright with decreased trendelenburg, but at a slower anjel.  Education:   - Results of above outlined education: Verbalizes understanding and Demonstrates understanding    PLAN                                                                                                                           Suggestions for next session as indicated: Progress per plan of care, continue strength and motion, walking control pattern       Therapy procedure time and total treatment time can be found documented on the Time Entry flowsheet     Tajik

## 2022-12-14 ENCOUNTER — APPOINTMENT (OUTPATIENT)
Dept: ENDOCRINOLOGY | Facility: CLINIC | Age: 62
End: 2022-12-14

## 2022-12-17 PROBLEM — E11.9 TYPE 2 DIABETES MELLITUS WITHOUT COMPLICATIONS: Chronic | Status: ACTIVE | Noted: 2022-11-07

## 2022-12-17 PROBLEM — E78.5 HYPERLIPIDEMIA, UNSPECIFIED: Chronic | Status: ACTIVE | Noted: 2022-11-07

## 2023-08-14 ENCOUNTER — EMERGENCY (EMERGENCY)
Facility: HOSPITAL | Age: 63
LOS: 1 days | Discharge: HOME | End: 2023-08-14
Attending: EMERGENCY MEDICINE
Payer: COMMERCIAL

## 2023-08-14 VITALS
TEMPERATURE: 98 F | WEIGHT: 179.9 LBS | SYSTOLIC BLOOD PRESSURE: 126 MMHG | OXYGEN SATURATION: 98 % | HEART RATE: 71 BPM | DIASTOLIC BLOOD PRESSURE: 70 MMHG | RESPIRATION RATE: 20 BRPM

## 2023-08-14 PROCEDURE — 99283 EMERGENCY DEPT VISIT LOW MDM: CPT

## 2023-08-14 PROCEDURE — 99282 EMERGENCY DEPT VISIT SF MDM: CPT

## 2023-08-14 PROCEDURE — T1013: CPT

## 2023-08-14 NOTE — ED PROVIDER NOTE - NSFOLLOWUPINSTRUCTIONS_ED_ALL_ED_FT
Pashto    Hemorragia subconjuntival  Subconjunctival Hemorrhage  La hemorragia subconjuntival es el sangrado que se produce entre la parte rafael del neville (esclerótica) y la membrana transparente que recubre la parte externa de jennifer órgano (conjuntiva). Cerca de la superficie del neville hay muchos vasos sanguíneos diminutos. La hemorragia subconjuntival ocurre cuando olman o más de estos vasos sanguíneos se rompen y sangran, lo que deriva en la aparición de pascale liliya rocco en el neville. Esta es similar a un moretón.    En función de la magnitud del sangrado, la liliya rocco puede cubrir únicamente pascale pequeña ora del neville o la totalidad de la parte visible de la esclerótica. Si se acumula mucha ozzie debajo de la conjuntiva, también puede vince inflamación. Las hemorragias subconjuntivales no afectan la visión ni causan dolor, michele puede vince sensación de irritación ocular si hay inflamación. En general, las hemorragias subconjuntivales no requieren tratamiento y, usualmente, desaparecen solas en el término de dos a cuatro semanas.    ¿Cuáles son las causas?  Esta afección puede ser causada por lo siguiente:  Un traumatismo leve, jo ann frotarse los ojos con mucha fuerza.  Lesiones por contusiones, jo ann por ejemplo practicar deportes o tener contacto con un airbag desplegado.  Toser, estornudar o vomitar.  Realizar esfuerzos, jo ann ocurre al levantar un objeto pesado.  Trastornos médicos tales jo ann:  Presión arterial sohail.  Diabetes.  Cirugías recientes del neville.  Algunos medicamentos, especialmente los anticoagulantes, incluida la aspirina.  Otras afecciones, jo ann los tumores en los ojos, los trastornos hemorrágicos o las anomalías de los vasos sanguíneos.  Las hemorragias subconjuntivales también pueden producirse sin pascale causa aparente.    ¿Cuáles son los signos o síntomas?  Eyes showing the white of the left eye completely red.  Los síntomas de esta afección incluyen:  Pascale liliya de color vazquez oscuro o brillante en la parte rafael del neville. La ora rocco puede:  Extenderse hasta cubrir pascale ora más duarte del neville antes de desaparecer.  Cambiar de color, jo ann mendy o amarillo amarronado, antes de desaparecer.  Hinchazón alrededor del neville.  Irritación leve del neville.  ¿Cómo se diagnostica?  Esta afección se diagnostica mediante un examen físico. Si la hemorragia subconjuntival fue causada por un traumatismo, el médico puede derivarlo a un oculista (oftalmólogo) o a otro especialista para que lo examinen en busca de otras lesiones. Pueden hacerle otras pruebas, por ejemplo:  Un examen ocular que incluye pascale prueba de visión, pascale revisión ocular con un tipo de microscopio (lámpara de hendidura) y la medición de la presión ocular. Es posible que se le dilate el neville, especialmente si la hemorragia subconjuntival fue causada por un traumatismo.  Un control de la presión arterial.  Análisis de ozzie para detectar la presencia de trastornos hemorrágicos.  Si la hemorragia subconjuntival fue causada por un traumatismo, pueden hacerle radiografías o pascale exploración por tomografía computarizada (TC) para determinar si hay otras lesiones.    ¿Cómo se trata?  Generalmente, no se requiere un tratamiento para esta afección. Si tiene molestias, el médico puede recomendarle que se aplique gotas oftálmicas o compresas frías.    Siga estas instrucciones en pineda casa:  [place] los medicamentos de venta misael y los recetados solamente jo ann se lo haya indicado el médico.  Aplique las gotas oftálmicas o las compresas frías para aliviar las molestias jo ann se lo haya indicado el médico.  Evite las actividades, las cosas y los entornos que pueden causarle irritación o lesiones en el neville.  Concurra a todas las visitas de seguimiento. [place] es importante.  Comuníquese con un médico si:  Siente dolor en el neville.  El sangrado no desaparece en el término de 4 semanas.  Sigue teniendo nuevas hemorragias subconjuntivales.  Solicite ayuda de inmediato si:  Tiene cambios en la visión, dificultad para tierney o visión doble.  Repentinamente, tiene mucha sensibilidad a la [place]ap[place].  Tiene dolor de brionna intenso, vómitos persistentes, confusión o un cansancio que no es normal (letargo).  Parece que el neville sobresale o se protruye de la órbita.  Le aparecen moretones en el cuerpo sin motivo.  Tiene sangrado en otra parte del cuerpo sin motivo.  Estos síntomas pueden representar un problema grave que constituye pascale emergencia. No espere a tierney si los síntomas desaparecen. Solicite atención médica de inmediato. Comuníquese con el servicio de emergencias de pineda localidad (911 en los [place]). No conduzca por markell propios medios hasta el hospital.    Resumen  La hemorragia subconjuntival es el sangrado que se produce entre la parte rafael del neville y la membrana transparente que recubre la parte externa de jennifer órgano.  Esta afección es similar a un miriam.  En general, las hemorragias subconjuntivales no requieren tratamiento y, usualmente, desaparecen solas en el término de dos a cuatro semanas.  Aplique las gotas oftálmicas o las compresas frías para aliviar las molestias jo ann se lo haya indicado el médico.  Esta información no tiene jo ann fin reemplazar el consejo del médico. Asegúrese de hacerle al médico cualquier pregunta que tenga.    Document Revised: 03/22/2022 Document Reviewed: 03/22/2022  Elsevier Patient Education © 2023 Elsevier Inc.

## 2023-08-14 NOTE — ED PROVIDER NOTE - PATIENT PORTAL LINK FT
You can access the FollowMyHealth Patient Portal offered by [facility] by registering at the following website: [email or web address]. By joining [facility]’s FollowMyHealth portal, you will also be able to view your health information using other applications (apps) compatible with our system.

## 2023-08-14 NOTE — ED ADULT NURSE NOTE - SUICIDE SCREENING QUESTION 1
From: Juice Wooten  To: Dimitri Lentz MD  Sent: 3/8/2017 8:16 AM CST  Subject: Still have symptoms    I still have diarrhea, sinuses have kicked back in, sore throat for everyone in the family. I have a 315 scheduled with you today. My wife has a 1240 appointment with Dr. Gonzalez, and my son has a 1pm with Dr Garcia in pediatrics. Any recommendations or openings before that please call.   No

## 2023-08-14 NOTE — ED PROVIDER NOTE - PHYSICAL EXAMINATION
General: Awake, alert, well appearing  HEENT: R subconjunctival hemorrhage without hyphema. EOMI. Moist mucous membranes.   Neck:. Soft and supple. Trachea midline  Cardiac: Extremities warm and well perfused. No LE edema.  Resp: No respiratory distress or accessory muscle use.   Abd: Soft, non-distended. No overlying skin changes  Skin: No rashes, abrasions, or lacerations.  Neuro: AO x 4. Moves all extremities symmetrically. Motor strength and sensation grossly intact.  Psych: Appropriate mood and affect

## 2023-08-14 NOTE — ED PROVIDER NOTE - OBJECTIVE STATEMENT
63M hx HLD presents with 4 days of R eye redness. Pt states he awoke 4 days ago with redness in his R eye which he believes began when he rubbed his eye in his sleep. Since then the redness has increased. He has been using ketotifen drops without improvement. No eye pain, drainage, HA, vision changes, N/V.

## 2023-08-14 NOTE — ED PROVIDER NOTE - ATTENDING CONTRIBUTION TO CARE
63 no blood thinners presenting with acute onset right-sided eye redness, no visual deficit, no pain, no itching, no trauma or headache.    Exam  Eyes: EOMI, there is conjunctival hemorrhage involving approximately 50% of the globe, no hyphema or hypopyon, normal round pupil, red reflex present on funduscopy, left eye visual acuity 20/30 right eye visual acuity 20/20 no evidence of proptosis    Presentation consistent with subconjunctival hemorrhage atraumatic, discharged with ophthalmology follow-up.
No

## 2023-08-14 NOTE — ED PROVIDER NOTE - CLINICAL SUMMARY MEDICAL DECISION MAKING FREE TEXT BOX
63M presents with 4 days of R eye redeness. History and exam consistent with subconjunctival hemorrhage. Pt was counseled that his sx should improve within 2 weeks. Recommended he follow up with his PMD or an ophthalmologist as needed. Pt expressed understanding and was discharged

## 2023-08-14 NOTE — ED PROVIDER NOTE - NS ED ATTENDING STATEMENT MOD
I have seen and examined this patient and fully participated in the care of this patient as the teaching attending.  The service was shared with the BRIAN.  I reviewed and verified the documentation and independently performed the documented:

## 2023-10-17 ENCOUNTER — EMERGENCY (EMERGENCY)
Facility: HOSPITAL | Age: 63
LOS: 1 days | Discharge: HOME | End: 2023-10-17
Attending: EMERGENCY MEDICINE
Payer: COMMERCIAL

## 2023-10-17 VITALS
SYSTOLIC BLOOD PRESSURE: 107 MMHG | HEIGHT: 65 IN | RESPIRATION RATE: 20 BRPM | TEMPERATURE: 98 F | DIASTOLIC BLOOD PRESSURE: 69 MMHG | HEART RATE: 73 BPM | WEIGHT: 76.06 LBS | OXYGEN SATURATION: 98 %

## 2023-10-17 VITALS
DIASTOLIC BLOOD PRESSURE: 71 MMHG | HEART RATE: 68 BPM | SYSTOLIC BLOOD PRESSURE: 136 MMHG | RESPIRATION RATE: 20 BRPM | OXYGEN SATURATION: 97 %

## 2023-10-17 LAB
ACETONE SERPL-MCNC: NEGATIVE — SIGNIFICANT CHANGE UP
ACETONE SERPL-MCNC: NEGATIVE — SIGNIFICANT CHANGE UP
ALBUMIN SERPL ELPH-MCNC: 4.6 G/DL — SIGNIFICANT CHANGE UP (ref 3.3–5.2)
ALBUMIN SERPL ELPH-MCNC: 4.6 G/DL — SIGNIFICANT CHANGE UP (ref 3.3–5.2)
ALP SERPL-CCNC: 129 U/L — HIGH (ref 40–120)
ALP SERPL-CCNC: 129 U/L — HIGH (ref 40–120)
ALT FLD-CCNC: 19 U/L — SIGNIFICANT CHANGE UP
ALT FLD-CCNC: 19 U/L — SIGNIFICANT CHANGE UP
ANION GAP SERPL CALC-SCNC: 13 MMOL/L — SIGNIFICANT CHANGE UP (ref 5–17)
ANION GAP SERPL CALC-SCNC: 13 MMOL/L — SIGNIFICANT CHANGE UP (ref 5–17)
APPEARANCE UR: CLEAR — SIGNIFICANT CHANGE UP
APPEARANCE UR: CLEAR — SIGNIFICANT CHANGE UP
AST SERPL-CCNC: 16 U/L — SIGNIFICANT CHANGE UP
AST SERPL-CCNC: 16 U/L — SIGNIFICANT CHANGE UP
BACTERIA # UR AUTO: ABNORMAL
BACTERIA # UR AUTO: ABNORMAL
BASE EXCESS BLDV CALC-SCNC: 1.9 MMOL/L — SIGNIFICANT CHANGE UP (ref -2–3)
BASE EXCESS BLDV CALC-SCNC: 1.9 MMOL/L — SIGNIFICANT CHANGE UP (ref -2–3)
BASOPHILS # BLD AUTO: 0.02 K/UL — SIGNIFICANT CHANGE UP (ref 0–0.2)
BASOPHILS # BLD AUTO: 0.02 K/UL — SIGNIFICANT CHANGE UP (ref 0–0.2)
BASOPHILS NFR BLD AUTO: 0.3 % — SIGNIFICANT CHANGE UP (ref 0–2)
BASOPHILS NFR BLD AUTO: 0.3 % — SIGNIFICANT CHANGE UP (ref 0–2)
BILIRUB SERPL-MCNC: 1.2 MG/DL — SIGNIFICANT CHANGE UP (ref 0.4–2)
BILIRUB SERPL-MCNC: 1.2 MG/DL — SIGNIFICANT CHANGE UP (ref 0.4–2)
BILIRUB UR-MCNC: NEGATIVE — SIGNIFICANT CHANGE UP
BILIRUB UR-MCNC: NEGATIVE — SIGNIFICANT CHANGE UP
BUN SERPL-MCNC: 15.9 MG/DL — SIGNIFICANT CHANGE UP (ref 8–20)
BUN SERPL-MCNC: 15.9 MG/DL — SIGNIFICANT CHANGE UP (ref 8–20)
CALCIUM SERPL-MCNC: 9.3 MG/DL — SIGNIFICANT CHANGE UP (ref 8.4–10.5)
CALCIUM SERPL-MCNC: 9.3 MG/DL — SIGNIFICANT CHANGE UP (ref 8.4–10.5)
CHLORIDE SERPL-SCNC: 98 MMOL/L — SIGNIFICANT CHANGE UP (ref 96–108)
CHLORIDE SERPL-SCNC: 98 MMOL/L — SIGNIFICANT CHANGE UP (ref 96–108)
CO2 SERPL-SCNC: 24 MMOL/L — SIGNIFICANT CHANGE UP (ref 22–29)
CO2 SERPL-SCNC: 24 MMOL/L — SIGNIFICANT CHANGE UP (ref 22–29)
COLOR SPEC: YELLOW — SIGNIFICANT CHANGE UP
COLOR SPEC: YELLOW — SIGNIFICANT CHANGE UP
CREAT SERPL-MCNC: 0.53 MG/DL — SIGNIFICANT CHANGE UP (ref 0.5–1.3)
CREAT SERPL-MCNC: 0.53 MG/DL — SIGNIFICANT CHANGE UP (ref 0.5–1.3)
DIFF PNL FLD: NEGATIVE — SIGNIFICANT CHANGE UP
DIFF PNL FLD: NEGATIVE — SIGNIFICANT CHANGE UP
EGFR: 113 ML/MIN/1.73M2 — SIGNIFICANT CHANGE UP
EGFR: 113 ML/MIN/1.73M2 — SIGNIFICANT CHANGE UP
EOSINOPHIL # BLD AUTO: 0.09 K/UL — SIGNIFICANT CHANGE UP (ref 0–0.5)
EOSINOPHIL # BLD AUTO: 0.09 K/UL — SIGNIFICANT CHANGE UP (ref 0–0.5)
EOSINOPHIL NFR BLD AUTO: 1.2 % — SIGNIFICANT CHANGE UP (ref 0–6)
EOSINOPHIL NFR BLD AUTO: 1.2 % — SIGNIFICANT CHANGE UP (ref 0–6)
EPI CELLS # UR: SIGNIFICANT CHANGE UP
EPI CELLS # UR: SIGNIFICANT CHANGE UP
GAS PNL BLDV: SIGNIFICANT CHANGE UP
GAS PNL BLDV: SIGNIFICANT CHANGE UP
GLUCOSE SERPL-MCNC: 280 MG/DL — HIGH (ref 70–99)
GLUCOSE SERPL-MCNC: 280 MG/DL — HIGH (ref 70–99)
GLUCOSE UR QL: 1000 MG/DL
GLUCOSE UR QL: 1000 MG/DL
HCO3 BLDV-SCNC: 27 MMOL/L — SIGNIFICANT CHANGE UP (ref 22–29)
HCO3 BLDV-SCNC: 27 MMOL/L — SIGNIFICANT CHANGE UP (ref 22–29)
HCT VFR BLD CALC: 40.5 % — SIGNIFICANT CHANGE UP (ref 39–50)
HCT VFR BLD CALC: 40.5 % — SIGNIFICANT CHANGE UP (ref 39–50)
HGB BLD-MCNC: 14.6 G/DL — SIGNIFICANT CHANGE UP (ref 13–17)
HGB BLD-MCNC: 14.6 G/DL — SIGNIFICANT CHANGE UP (ref 13–17)
IMM GRANULOCYTES NFR BLD AUTO: 0.4 % — SIGNIFICANT CHANGE UP (ref 0–0.9)
IMM GRANULOCYTES NFR BLD AUTO: 0.4 % — SIGNIFICANT CHANGE UP (ref 0–0.9)
KETONES UR-MCNC: ABNORMAL
KETONES UR-MCNC: ABNORMAL
LEUKOCYTE ESTERASE UR-ACNC: NEGATIVE — SIGNIFICANT CHANGE UP
LEUKOCYTE ESTERASE UR-ACNC: NEGATIVE — SIGNIFICANT CHANGE UP
LIDOCAIN IGE QN: 22 U/L — SIGNIFICANT CHANGE UP (ref 22–51)
LIDOCAIN IGE QN: 22 U/L — SIGNIFICANT CHANGE UP (ref 22–51)
LYMPHOCYTES # BLD AUTO: 1.29 K/UL — SIGNIFICANT CHANGE UP (ref 1–3.3)
LYMPHOCYTES # BLD AUTO: 1.29 K/UL — SIGNIFICANT CHANGE UP (ref 1–3.3)
LYMPHOCYTES # BLD AUTO: 16.7 % — SIGNIFICANT CHANGE UP (ref 13–44)
LYMPHOCYTES # BLD AUTO: 16.7 % — SIGNIFICANT CHANGE UP (ref 13–44)
MAGNESIUM SERPL-MCNC: 1.9 MG/DL — SIGNIFICANT CHANGE UP (ref 1.6–2.6)
MAGNESIUM SERPL-MCNC: 1.9 MG/DL — SIGNIFICANT CHANGE UP (ref 1.6–2.6)
MCHC RBC-ENTMCNC: 28.8 PG — SIGNIFICANT CHANGE UP (ref 27–34)
MCHC RBC-ENTMCNC: 28.8 PG — SIGNIFICANT CHANGE UP (ref 27–34)
MCHC RBC-ENTMCNC: 36 GM/DL — SIGNIFICANT CHANGE UP (ref 32–36)
MCHC RBC-ENTMCNC: 36 GM/DL — SIGNIFICANT CHANGE UP (ref 32–36)
MCV RBC AUTO: 79.9 FL — LOW (ref 80–100)
MCV RBC AUTO: 79.9 FL — LOW (ref 80–100)
MONOCYTES # BLD AUTO: 0.42 K/UL — SIGNIFICANT CHANGE UP (ref 0–0.9)
MONOCYTES # BLD AUTO: 0.42 K/UL — SIGNIFICANT CHANGE UP (ref 0–0.9)
MONOCYTES NFR BLD AUTO: 5.4 % — SIGNIFICANT CHANGE UP (ref 2–14)
MONOCYTES NFR BLD AUTO: 5.4 % — SIGNIFICANT CHANGE UP (ref 2–14)
NEUTROPHILS # BLD AUTO: 5.88 K/UL — SIGNIFICANT CHANGE UP (ref 1.8–7.4)
NEUTROPHILS # BLD AUTO: 5.88 K/UL — SIGNIFICANT CHANGE UP (ref 1.8–7.4)
NEUTROPHILS NFR BLD AUTO: 76 % — SIGNIFICANT CHANGE UP (ref 43–77)
NEUTROPHILS NFR BLD AUTO: 76 % — SIGNIFICANT CHANGE UP (ref 43–77)
NITRITE UR-MCNC: NEGATIVE — SIGNIFICANT CHANGE UP
NITRITE UR-MCNC: NEGATIVE — SIGNIFICANT CHANGE UP
PCO2 BLDV: 47 MMHG — SIGNIFICANT CHANGE UP (ref 42–55)
PCO2 BLDV: 47 MMHG — SIGNIFICANT CHANGE UP (ref 42–55)
PH BLDV: 7.37 — SIGNIFICANT CHANGE UP (ref 7.32–7.43)
PH BLDV: 7.37 — SIGNIFICANT CHANGE UP (ref 7.32–7.43)
PH UR: 7 — SIGNIFICANT CHANGE UP (ref 5–8)
PH UR: 7 — SIGNIFICANT CHANGE UP (ref 5–8)
PHOSPHATE SERPL-MCNC: 3 MG/DL — SIGNIFICANT CHANGE UP (ref 2.4–4.7)
PHOSPHATE SERPL-MCNC: 3 MG/DL — SIGNIFICANT CHANGE UP (ref 2.4–4.7)
PLATELET # BLD AUTO: 154 K/UL — SIGNIFICANT CHANGE UP (ref 150–400)
PLATELET # BLD AUTO: 154 K/UL — SIGNIFICANT CHANGE UP (ref 150–400)
PO2 BLDV: 71 MMHG — HIGH (ref 25–45)
PO2 BLDV: 71 MMHG — HIGH (ref 25–45)
POTASSIUM SERPL-MCNC: 4 MMOL/L — SIGNIFICANT CHANGE UP (ref 3.5–5.3)
POTASSIUM SERPL-MCNC: 4 MMOL/L — SIGNIFICANT CHANGE UP (ref 3.5–5.3)
POTASSIUM SERPL-SCNC: 4 MMOL/L — SIGNIFICANT CHANGE UP (ref 3.5–5.3)
POTASSIUM SERPL-SCNC: 4 MMOL/L — SIGNIFICANT CHANGE UP (ref 3.5–5.3)
PROT SERPL-MCNC: 7.3 G/DL — SIGNIFICANT CHANGE UP (ref 6.6–8.7)
PROT SERPL-MCNC: 7.3 G/DL — SIGNIFICANT CHANGE UP (ref 6.6–8.7)
PROT UR-MCNC: 30 MG/DL
PROT UR-MCNC: 30 MG/DL
RBC # BLD: 5.07 M/UL — SIGNIFICANT CHANGE UP (ref 4.2–5.8)
RBC # BLD: 5.07 M/UL — SIGNIFICANT CHANGE UP (ref 4.2–5.8)
RBC # FLD: 13.7 % — SIGNIFICANT CHANGE UP (ref 10.3–14.5)
RBC # FLD: 13.7 % — SIGNIFICANT CHANGE UP (ref 10.3–14.5)
RBC CASTS # UR COMP ASSIST: SIGNIFICANT CHANGE UP /HPF (ref 0–4)
RBC CASTS # UR COMP ASSIST: SIGNIFICANT CHANGE UP /HPF (ref 0–4)
SAO2 % BLDV: 93.4 % — SIGNIFICANT CHANGE UP
SAO2 % BLDV: 93.4 % — SIGNIFICANT CHANGE UP
SODIUM SERPL-SCNC: 135 MMOL/L — SIGNIFICANT CHANGE UP (ref 135–145)
SODIUM SERPL-SCNC: 135 MMOL/L — SIGNIFICANT CHANGE UP (ref 135–145)
SP GR SPEC: 1.01 — SIGNIFICANT CHANGE UP (ref 1.01–1.02)
SP GR SPEC: 1.01 — SIGNIFICANT CHANGE UP (ref 1.01–1.02)
UROBILINOGEN FLD QL: 1 MG/DL
UROBILINOGEN FLD QL: 1 MG/DL
WBC # BLD: 7.73 K/UL — SIGNIFICANT CHANGE UP (ref 3.8–10.5)
WBC # BLD: 7.73 K/UL — SIGNIFICANT CHANGE UP (ref 3.8–10.5)
WBC # FLD AUTO: 7.73 K/UL — SIGNIFICANT CHANGE UP (ref 3.8–10.5)
WBC # FLD AUTO: 7.73 K/UL — SIGNIFICANT CHANGE UP (ref 3.8–10.5)
WBC UR QL: SIGNIFICANT CHANGE UP /HPF (ref 0–5)
WBC UR QL: SIGNIFICANT CHANGE UP /HPF (ref 0–5)

## 2023-10-17 PROCEDURE — 82803 BLOOD GASES ANY COMBINATION: CPT

## 2023-10-17 PROCEDURE — 84100 ASSAY OF PHOSPHORUS: CPT

## 2023-10-17 PROCEDURE — 99284 EMERGENCY DEPT VISIT MOD MDM: CPT

## 2023-10-17 PROCEDURE — 99283 EMERGENCY DEPT VISIT LOW MDM: CPT | Mod: 25

## 2023-10-17 PROCEDURE — 36415 COLL VENOUS BLD VENIPUNCTURE: CPT

## 2023-10-17 PROCEDURE — 85025 COMPLETE CBC W/AUTO DIFF WBC: CPT

## 2023-10-17 PROCEDURE — 83690 ASSAY OF LIPASE: CPT

## 2023-10-17 PROCEDURE — 96360 HYDRATION IV INFUSION INIT: CPT

## 2023-10-17 PROCEDURE — T1013: CPT

## 2023-10-17 PROCEDURE — 82009 KETONE BODYS QUAL: CPT

## 2023-10-17 PROCEDURE — 83735 ASSAY OF MAGNESIUM: CPT

## 2023-10-17 PROCEDURE — 80053 COMPREHEN METABOLIC PANEL: CPT

## 2023-10-17 PROCEDURE — 82962 GLUCOSE BLOOD TEST: CPT

## 2023-10-17 PROCEDURE — 81001 URINALYSIS AUTO W/SCOPE: CPT

## 2023-10-17 RX ORDER — SODIUM CHLORIDE 9 MG/ML
1000 INJECTION INTRAMUSCULAR; INTRAVENOUS; SUBCUTANEOUS ONCE
Refills: 0 | Status: COMPLETED | OUTPATIENT
Start: 2023-10-17 | End: 2023-10-17

## 2023-10-17 RX ADMIN — SODIUM CHLORIDE 1000 MILLILITER(S): 9 INJECTION INTRAMUSCULAR; INTRAVENOUS; SUBCUTANEOUS at 11:20

## 2023-10-17 RX ADMIN — SODIUM CHLORIDE 1000 MILLILITER(S): 9 INJECTION INTRAMUSCULAR; INTRAVENOUS; SUBCUTANEOUS at 10:44

## 2023-10-17 NOTE — ED ADULT NURSE REASSESSMENT NOTE - NS ED NURSE REASSESS COMMENT FT1
Pt laying in bed, talking w/ sister @ bedside. Pt A&O x4 in NAD. Pt c/o elevated BG, polyuria, & increased thirst. Pt denies any pain, fevers, n/v/d, abd pain.

## 2023-10-17 NOTE — ED PROVIDER NOTE - OBJECTIVE STATEMENT
63-year-old male patient with a history of uncontrolled diabetes, noncompliant with medication presents to the ED complaining of urinary frequency, polydipsia.  Patient states that for the past 2 weeks he has had increased thirst, frequent urination, has not been taking his insulin as he is supposed to.  Patient has had similar symptoms in the past when his sugar has been elevated.  He denies any chest pain shortness of breath, abdominal pain, nausea vomiting or diarrhea, dysuria, hematuria.  No vision changes or gait abnormality.  No fevers at home.  No further complaints at this time

## 2023-10-17 NOTE — ED ADULT TRIAGE NOTE - CHIEF COMPLAINT QUOTE
Pt arrives to ED stating has not been taking diabetics medications for three weeks . c/o frequent urination - Poor historian

## 2023-10-17 NOTE — ED ADULT NURSE REASSESSMENT NOTE - BOWEL SOUNDS RLQ
NIAS to bedside for PIV start.  Pt states MD in says does not need PIV at this time.  RN notified.  Re-consult as needed.   present

## 2023-10-17 NOTE — ED PROVIDER NOTE - NSFOLLOWUPINSTRUCTIONS_ED_ALL_ED_FT
Conceptos básicos de la diabetes mellitus  Diabetes Mellitus Basics    La diabetes mellitus, o (diabetes) es pascale enfermedad prolongada (crónica). Se produce cuando el cuerpo no utiliza correctamente el azúcar (glucosa) que se libera de los alimentos después de comer.    La diabetes mellitus puede deberse a olman de estos problemas o a ambos:  El páncreas no produce suficiente cantidad de pascale hormona llamada insulina.  El cuerpo no reacciona de forma normal a la insulina que produce.  La insulina permite que la glucosa ingrese a las células del cuerpo. [place] le proporciona energía. Si tiene diabetes, la glucosa no puede ingresar a las células. [place] produce un aumento de la glucemia (hiperglucemia).    Cómo tratar y controlar la diabetes  Es posible que tenga que administrarse insulina u otros medicamentos para la diabetes todos los días para mantener el nivel de glucosa en la ozzie equilibrado. Si le recetan insulina, aprenderá cómo aplicársela con inyecciones. Es posible que deba ajustar la cantidad de insulina en función de los alimentos que coma.    Tendrá que controlarse los niveles de glucemia con un monitor de glucosa jo ann se lo haya indicado el médico. Las lecturas pueden ayudar a determinar si tiene un nivel bajo o alto de glucemia.    Generalmente, los resultados de los niveles de glucemia deben ser los siguientes:  Antes de las comidas (preprandial): de 80 a 130 mg/dl (de 4,4 a 7,2 mmol/l).  Después de las comidas (posprandial): por debajo de 180 mg/dl (10 mmol/l).  Nivel de hemoglobina A1c (HbA1c): menos del 7 %.  El médico fijará objetivos de tratamiento para usted.    Cumpla con todas las visitas de seguimiento. [place] es importante.    Siga estas instrucciones en pineda casa:  Medicamentos para la diabetes    Use los medicamentos para la diabetes jo ann se lo haya indicado el médico. Niurka pascale lista de los medicamentos para la diabetes aquí:  Nombre del medicamento: ______________________________  Cantidad (dosis): ________________ Hora (a. m./p. m.): _______________ Notas: ___________________________________  Nombre del medicamento: ______________________________  Cantidad (dosis): ________________ Hora (a. m./p. m.): _______________ Notas: ___________________________________  Nombre del medicamento: ______________________________  Cantidad (dosis): ________________ Hora (a. m./p. m.): _______________ Notas: ___________________________________  Insulina    Si usa insulina, niurka pascale lista de los tipos de insulina que usa aquí:  Tipo de insulina: ______________________________  Cantidad (dosis): ________________ Hora (a. m./p. m.): _______________Notas: ___________________________________  Tipo de insulina: ______________________________  Cantidad (dosis): ________________ Hora (a. m./p. m.): _______________ Notas: ___________________________________  Tipo de insulina: ______________________________  Cantidad (dosis): ________________ Hora (a. m./p. m.): _______________ Notas: ___________________________________  Tipo de insulina: ______________________________  Cantidad (dosis): ________________ Hora (a. m./p. m.): _______________ Notas: ___________________________________  Tipo de insulina: ______________________________  Cantidad (dosis): ________________ Hora (a. m./p. m.): _______________ Notas: ___________________________________  Cómo controlar la glucemia      Contrólese los niveles de glucemia con un monitor de glucosa jo ann se lo haya indicado el médico.    Anote las veces que se controla los niveles de glucosa:  Hora: _______________ Notas: ___________________________________  Hora: _______________ Notas: ___________________________________  Hora: _______________ Notas: ___________________________________  Hora: _______________ Notas: ___________________________________  Hora: _______________ Notas: ___________________________________  Hora: _______________ Notas: ___________________________________  Nivel bajo de glucemia    El nivel bajo de glucemia (hipoglucemia) se produce cuando la glucosa es igual o jenny que 70 mg/dl (3.9 mmol/l). Entre los síntomas, se pueden incluir los siguientes:  Sentir:  Hambre.  Sudoración y piel húmeda.  Irritabilidad o enfado con facilidad.  Mareos.  Somnolencia.  Tener:  Latidos cardíacos acelerados.  Dolor de brionna.  Cambios en la visión.  Hormigueo o adormecimiento alrededor de la boca, labios o lengua.  Dificultades para hacer lo siguiente:  Moverse (coordinación).  Dormir.  Cómo tratar un nivel bajo de glucemia    Para tratar un nivel bajo de glucemia, ingiera un alimento o pascale bebida azucarada de inmediato. Si puede pensar con claridad y tragar de manera jackson, siga la choco 15/15, que consiste en lo siguiente:  [place] 15 gramos de un hidrato de carbono (carbohidrato) de acción rápida, jo ann le indicó pineda médico.  Algunos hidratos de carbono de acción rápida son:  Comprimidos de glucosa: tome 3 o 4 comprimidos.  Caramelos duros: coma 3 a 5 unidades.  Jugo de fruta: mita 4 onzas (120 ml).  Refresco común (no dietético): mita de 4 a 6 onzas (de 120 a 180 ml).  Miel o azúcar: coma 1 cucharada (15 ml).  Verifique markell niveles de glucemia 15 minutes después de ingerir el hidrato de carbono.  Si la glucosa todavía es igual o jenny que 70 mg/dl (3.9 mmol/l), ingiera nuevamente 15 gramos de un hidrato de carbono.  Si la glucosa no supera los 70 mg/dl (3.9 mmol/l) después de 3 intentos, solicite ayuda de inmediato.  Ingiera pascale comida o pascale colación en el transcurso de 1 hora después de que la glucosa se haya normalizado.  Cómo tratar un nivel muy bajo de glucemia    Si la glucosa es igual o jenny que 54 mg/dl (3 mmol/l), significa que pineda nivel de glucemia está muy bajo (hipoglucemia grave).    [place] es pascale emergencia. No espere a tierney si los síntomas desaparecen. Solicite atención médica de inmediato. Comuníquese con el servicio de emergencias de pineda localidad (911 en los [place]). No conduzca por markell propios medios hasta el hospital.    Preguntas para hacerle al médico  ¿Es necesario que converse con un educador en el cuidado de la diabetes?  ¿Qué equipos necesitaré para cuidarme en casa?  ¿Qué medicamentos para la diabetes necesito? ¿Cuándo armin tomarlos?  ¿Con qué frecuencia armin comprobar mis niveles de glucemia?  ¿A qué número puedo llamar si tengo preguntas?  ¿Cuándo es mi visita de seguimiento?  ¿Dónde puedo encontrar un betty de apoyo para las personas con diabetes?  Dónde buscar más información  American Diabetes Association (Asociación Estadounidense de la Diabetes): www.diabetes.org  Association of Diabetes Care and Education Specialists (Asociación de Especialistas en Atención y Educación sobre la Diabetes): www.diabeteseducator.org  Comuníquese con un médico si:  El nivel de glucemia es mayor o igual que 240 mg/dl (13.3 mmol/dl) blake 2 días seguidos.  Ha estado enfermo o ha tenido fiebre blake 2 días o más y no mejora.  Tiene alguno de estos problemas blake más de 6 horas:  No puede comer ni beber.  Siente náuseas.  Vomita.  Tiene diarrea.  Solicite ayuda de inmediato si:  Pineda nivel de glucemia está por debajo de 54 mg/dl (3 mmol/l).  Se siente confundido.  Tiene problemas para pensar con claridad.  Tiene dificultad para respirar.  Estos síntomas pueden representar un problema grave que constituye pascale emergencia. No espere a tierney si los síntomas desaparecen. Solicite atención médica de inmediato. Comuníquese con el servicio de emergencias de pineda localidad (911 en los [place]). No conduzca por markell propios medios hasta el hospital.    Resumen  La diabetes mellitus es pascale enfermedad crónica que se produce cuando el cuerpo no utiliza correctamente el azúcar (glucosa) que se libera de los alimentos después de comer.  Aplíquese la insulina y tome los medicamentos para la diabetes jo ann se lo hayan indicado.  Controle pineda nivel de glucemia todos los días, con la frecuencia que le hayan indicado.  Cumpla con todas las visitas de seguimiento. [place] es importante.  Esta información no tiene jo ann fin reemplazar el consejo del médico. Asegúrese de hacerle al médico cualquier pregunta que tenga.

## 2023-10-17 NOTE — ED PROVIDER NOTE - PATIENT PORTAL LINK FT
You can access the FollowMyHealth Patient Portal offered by [facility] by registering at the following website: [email or web address]. By joining [facility]’s FollowMyHealth portal, you will also be able to view your health information using other applications (apps) compatible with our system.

## 2023-10-17 NOTE — ED ADULT NURSE NOTE - NSFALLUNIVINTERV_ED_ALL_ED
Bed/Stretcher in lowest position, wheels locked, appropriate side rails in place/Call bell, personal items and telephone in reach/Instruct patient to call for assistance before getting out of bed/chair/stretcher/Non-slip footwear applied when patient is off stretcher/[place] to call system/Physically safe environment - no spills, clutter or unnecessary equipment/Purposeful proactive rounding/Room/bathroom lighting operational, light cord in reach

## 2023-10-17 NOTE — ED ADULT NURSE NOTE - OBJECTIVE STATEMENT
Pt to ED from home c/o elevated blood glucose, increased thirst, & frequent urination s/p running out of diabetes medication approx. 2 wks ago. Pt denies fever, n/v/d, cough, abd pain.

## 2023-10-17 NOTE — ED PROVIDER NOTE - CLINICAL SUMMARY MEDICAL DECISION MAKING FREE TEXT BOX
63 year old male pt presents to the ED c/o urinary frequency. Will check labs, UA, give fluids, re-evaluate 63 year old male pt presents to the ED c/o urinary frequency. Will check labs, UA, give fluids, re-evaluate     patient feeling well, labs show elevated glucose but no other abnormalities, no DKA or HHS.  Patient and sister educated lengthily about strict compliance with diabetes medication and insulin, diet for diabetics, importance of following up with PCP on Monday for medication control.  Patient instructed to continue taking his medication as prescribed, return to the ED for confusion, headache or dizziness, chest pain or shortness of breath, abdominal pain.  Strict return precautions given.  Patient and family agree to plan of care

## 2023-10-24 NOTE — ED PROVIDER NOTE - CROS ED PSYCH ALL NEG
negative... Libtayo Pregnancy And Lactation Text: This medication is contraindicated in pregnancy and when breast feeding.

## 2024-01-02 NOTE — ED PROVIDER NOTE - ENMT, MLM
Behavioral Health Treatment Team Note     Patient goal(s) for today: pt does not identify a goal  Treatment team focus/goals: continue monitor medication, adjust as needed    Progress note: Pt presents on floor, naked and unmoving. Pt presents malodorous and room smells of urine. Pt continuing to require injections as he continues to refuse medications. Pt's [facility] clinician came to the unit and offered him a pack of cigarettes as those are usually what motivates him in the community, however pt did not respond when offered the pack of cigarettes. Pt mental state remains unchanged.    LOS:  25  Expected LOS: TBD    Insurance info/prescription coverage:  MEDICARE  Date of last family contact:  [facility] 1/2/24 and [facility]  Family requesting physician contact today:  No  Discharge plan:  TBD  Guns in the home:  No  Outpatient provider(s):  [facility]     Participating treatment team members: Nba Wilson MSW     Airway patent, Nasal mucosa clear. Mouth with normal mucosa. Throat has no vesicles, no oropharyngeal exudates and uvula is midline.

## 2024-02-27 NOTE — ED CDU PROVIDER SUBSEQUENT DAY NOTE - PHYSICAL EXAMINATION
Gen: well appearing, no acute distress  Head: normocephalic, atraumatic  EENT: EOMI, PERRLA, neck supple, dry mucous membranes, no scleral icterus  Lung: no increased work of breathing, clear to auscultation bilaterally, no wheezing, rales, rhonchi, speaking in full sentences  CV: regular rate, regular rhythm, normal s1/s2, 2+ radial pulses bilaterally  Abd: soft, non-tender, non-distended, no rebound tenderness or guarding; no CVA tenderness  MSK: No edema, no visible deformities, full range of motion in all 4 extremities  Neuro: CNII-XII intact, UE: 5/5 strength throughtout b/l, LE: 5/5 strength throughout b/l, no dysmetria on FTN or HTS, normal sensation intact b/l, Romberg negative, gait non-ataxic   Skin: No rash, no lesions, no jaundice  Psych: normal affect, normal speech
no

## 2024-05-26 NOTE — ED ADULT TRIAGE NOTE - ARRIVAL FROM
Alert, cooperative and visible intermittently. No SI or HI noted. States he has depression a 6/10, and a little anxiety. No c/o of pain. Continues to hears auditory hallucinations. Pt states that the voices are going to kill him. Pt states he does ignore them and the voices don't bother him at this time. Attended community meeting, exercise, coping skills, and spiritual. Consumed 100% of breakfast and 100% of lunch. Took all medication without prompting. Maintained on safe precautions without incident. Will continue to monitor progress and recovery program.    Home

## 2024-12-24 ENCOUNTER — EMERGENCY (EMERGENCY)
Facility: HOSPITAL | Age: 64
LOS: 1 days | Discharge: HOME | End: 2024-12-24
Attending: STUDENT IN AN ORGANIZED HEALTH CARE EDUCATION/TRAINING PROGRAM
Payer: COMMERCIAL

## 2024-12-24 VITALS
RESPIRATION RATE: 16 BRPM | OXYGEN SATURATION: 97 % | DIASTOLIC BLOOD PRESSURE: 66 MMHG | HEART RATE: 60 BPM | SYSTOLIC BLOOD PRESSURE: 107 MMHG | TEMPERATURE: 98 F

## 2024-12-24 VITALS
HEART RATE: 71 BPM | WEIGHT: 167.33 LBS | OXYGEN SATURATION: 96 % | TEMPERATURE: 98 F | RESPIRATION RATE: 20 BRPM | SYSTOLIC BLOOD PRESSURE: 110 MMHG | DIASTOLIC BLOOD PRESSURE: 73 MMHG

## 2024-12-24 LAB
A1C WITH ESTIMATED AVERAGE GLUCOSE RESULT: 12.1 % — HIGH (ref 4–5.6)
ACETONE SERPL-MCNC: NEGATIVE — SIGNIFICANT CHANGE UP
ALBUMIN SERPL ELPH-MCNC: 4.3 G/DL — SIGNIFICANT CHANGE UP (ref 3.3–5.2)
ALP SERPL-CCNC: 127 U/L — HIGH (ref 40–120)
ALT FLD-CCNC: 15 U/L — SIGNIFICANT CHANGE UP
ANION GAP SERPL CALC-SCNC: 12 MMOL/L — SIGNIFICANT CHANGE UP (ref 5–17)
APPEARANCE UR: CLEAR — SIGNIFICANT CHANGE UP
AST SERPL-CCNC: 16 U/L — SIGNIFICANT CHANGE UP
BACTERIA # UR AUTO: NEGATIVE /HPF — SIGNIFICANT CHANGE UP
BASE EXCESS BLDV CALC-SCNC: 0.4 MMOL/L — SIGNIFICANT CHANGE UP (ref -2–3)
BASOPHILS # BLD AUTO: 0.03 K/UL — SIGNIFICANT CHANGE UP (ref 0–0.2)
BASOPHILS NFR BLD AUTO: 0.5 % — SIGNIFICANT CHANGE UP (ref 0–2)
BILIRUB SERPL-MCNC: 0.6 MG/DL — SIGNIFICANT CHANGE UP (ref 0.4–2)
BILIRUB UR-MCNC: NEGATIVE — SIGNIFICANT CHANGE UP
BUN SERPL-MCNC: 18.4 MG/DL — SIGNIFICANT CHANGE UP (ref 8–20)
CALCIUM SERPL-MCNC: 8.8 MG/DL — SIGNIFICANT CHANGE UP (ref 8.4–10.5)
CAST: 0 /LPF — SIGNIFICANT CHANGE UP (ref 0–4)
CHLORIDE SERPL-SCNC: 101 MMOL/L — SIGNIFICANT CHANGE UP (ref 96–108)
CO2 SERPL-SCNC: 24 MMOL/L — SIGNIFICANT CHANGE UP (ref 22–29)
COLOR SPEC: YELLOW — SIGNIFICANT CHANGE UP
CREAT SERPL-MCNC: 0.94 MG/DL — SIGNIFICANT CHANGE UP (ref 0.5–1.3)
DIFF PNL FLD: NEGATIVE — SIGNIFICANT CHANGE UP
EGFR: 91 ML/MIN/1.73M2 — SIGNIFICANT CHANGE UP
EOSINOPHIL # BLD AUTO: 0.05 K/UL — SIGNIFICANT CHANGE UP (ref 0–0.5)
EOSINOPHIL NFR BLD AUTO: 0.9 % — SIGNIFICANT CHANGE UP (ref 0–6)
ESTIMATED AVERAGE GLUCOSE: 301 MG/DL — HIGH (ref 68–114)
GAS PNL BLDV: SIGNIFICANT CHANGE UP
GLUCOSE SERPL-MCNC: 322 MG/DL — HIGH (ref 70–99)
GLUCOSE UR QL: >=1000 MG/DL
HCO3 BLDV-SCNC: 25 MMOL/L — SIGNIFICANT CHANGE UP (ref 22–29)
HCT VFR BLD CALC: 41.2 % — SIGNIFICANT CHANGE UP (ref 39–50)
HGB BLD-MCNC: 14.4 G/DL — SIGNIFICANT CHANGE UP (ref 13–17)
IMM GRANULOCYTES NFR BLD AUTO: 0.3 % — SIGNIFICANT CHANGE UP (ref 0–0.9)
KETONES UR-MCNC: ABNORMAL MG/DL
LEUKOCYTE ESTERASE UR-ACNC: NEGATIVE — SIGNIFICANT CHANGE UP
LYMPHOCYTES # BLD AUTO: 1.33 K/UL — SIGNIFICANT CHANGE UP (ref 1–3.3)
LYMPHOCYTES # BLD AUTO: 23.2 % — SIGNIFICANT CHANGE UP (ref 13–44)
MCHC RBC-ENTMCNC: 27.6 PG — SIGNIFICANT CHANGE UP (ref 27–34)
MCHC RBC-ENTMCNC: 35 G/DL — SIGNIFICANT CHANGE UP (ref 32–36)
MCV RBC AUTO: 79.1 FL — LOW (ref 80–100)
MONOCYTES # BLD AUTO: 0.28 K/UL — SIGNIFICANT CHANGE UP (ref 0–0.9)
MONOCYTES NFR BLD AUTO: 4.9 % — SIGNIFICANT CHANGE UP (ref 2–14)
NEUTROPHILS # BLD AUTO: 4.03 K/UL — SIGNIFICANT CHANGE UP (ref 1.8–7.4)
NEUTROPHILS NFR BLD AUTO: 70.2 % — SIGNIFICANT CHANGE UP (ref 43–77)
NITRITE UR-MCNC: NEGATIVE — SIGNIFICANT CHANGE UP
PCO2 BLDV: 42 MMHG — SIGNIFICANT CHANGE UP (ref 42–55)
PH BLDV: 7.39 — SIGNIFICANT CHANGE UP (ref 7.32–7.43)
PH UR: 6 — SIGNIFICANT CHANGE UP (ref 5–8)
PLATELET # BLD AUTO: 148 K/UL — LOW (ref 150–400)
PO2 BLDV: 128 MMHG — HIGH (ref 25–45)
POTASSIUM SERPL-MCNC: 4.2 MMOL/L — SIGNIFICANT CHANGE UP (ref 3.5–5.3)
POTASSIUM SERPL-SCNC: 4.2 MMOL/L — SIGNIFICANT CHANGE UP (ref 3.5–5.3)
PROT SERPL-MCNC: 6.6 G/DL — SIGNIFICANT CHANGE UP (ref 6.6–8.7)
PROT UR-MCNC: SIGNIFICANT CHANGE UP MG/DL
RBC # BLD: 5.21 M/UL — SIGNIFICANT CHANGE UP (ref 4.2–5.8)
RBC # FLD: 13.8 % — SIGNIFICANT CHANGE UP (ref 10.3–14.5)
RBC CASTS # UR COMP ASSIST: 0 /HPF — SIGNIFICANT CHANGE UP (ref 0–4)
SAO2 % BLDV: 98.3 % — SIGNIFICANT CHANGE UP
SODIUM SERPL-SCNC: 137 MMOL/L — SIGNIFICANT CHANGE UP (ref 135–145)
SP GR SPEC: >1.03 — HIGH (ref 1–1.03)
SQUAMOUS # UR AUTO: 0 /HPF — SIGNIFICANT CHANGE UP (ref 0–5)
UROBILINOGEN FLD QL: 1 MG/DL — SIGNIFICANT CHANGE UP (ref 0.2–1)
WBC # BLD: 5.74 K/UL — SIGNIFICANT CHANGE UP (ref 3.8–10.5)
WBC # FLD AUTO: 5.74 K/UL — SIGNIFICANT CHANGE UP (ref 3.8–10.5)
WBC UR QL: 0 /HPF — SIGNIFICANT CHANGE UP (ref 0–5)

## 2024-12-24 PROCEDURE — 80053 COMPREHEN METABOLIC PANEL: CPT

## 2024-12-24 PROCEDURE — T1013: CPT

## 2024-12-24 PROCEDURE — 71046 X-RAY EXAM CHEST 2 VIEWS: CPT | Mod: 26

## 2024-12-24 PROCEDURE — 87086 URINE CULTURE/COLONY COUNT: CPT

## 2024-12-24 PROCEDURE — 99284 EMERGENCY DEPT VISIT MOD MDM: CPT | Mod: 25

## 2024-12-24 PROCEDURE — 83036 HEMOGLOBIN GLYCOSYLATED A1C: CPT

## 2024-12-24 PROCEDURE — 81001 URINALYSIS AUTO W/SCOPE: CPT

## 2024-12-24 PROCEDURE — 82009 KETONE BODYS QUAL: CPT

## 2024-12-24 PROCEDURE — 36415 COLL VENOUS BLD VENIPUNCTURE: CPT

## 2024-12-24 PROCEDURE — 99285 EMERGENCY DEPT VISIT HI MDM: CPT

## 2024-12-24 PROCEDURE — 71046 X-RAY EXAM CHEST 2 VIEWS: CPT

## 2024-12-24 PROCEDURE — 85025 COMPLETE CBC W/AUTO DIFF WBC: CPT

## 2024-12-24 PROCEDURE — 82962 GLUCOSE BLOOD TEST: CPT

## 2024-12-24 PROCEDURE — 82803 BLOOD GASES ANY COMBINATION: CPT

## 2024-12-24 RX ORDER — 0.9 % SODIUM CHLORIDE 0.9 %
1000 INTRAVENOUS SOLUTION INTRAVENOUS ONCE
Refills: 0 | Status: COMPLETED | OUTPATIENT
Start: 2024-12-24 | End: 2024-12-24

## 2024-12-24 RX ADMIN — Medication 500 MILLIGRAM(S): at 11:09

## 2024-12-24 RX ADMIN — Medication 1000 MILLILITER(S): at 10:13

## 2024-12-24 NOTE — ED ADULT NURSE NOTE - OBJECTIVE STATEMENT
pt to ED with c/o hyperglycemia. hx DM compliant with  insulin and metformin. reports increased thirst and urination x 1 week. FS at home 443. denies any c/o pain. A+O x3. RR even and unlabored. ambulatory with steady gait.

## 2024-12-24 NOTE — ED PROVIDER NOTE - PHYSICAL EXAMINATION
Const: Pt is well appearing. Awake, alert and oriented to person, place, & time. In no acute distress.   HEENT: NC/AT. TM's clear bilaterally. Oropharynx clear without exudates or erythema. Moist mucous membranes  Eyes: PERRLA. No scleral icterus. EOMI.  Neck:. Soft and supple. Full ROM without pain. No cervical midline tenderness.   Cardiac: Regular rate and regular rhythm. +S1/S2. No murmurs, rubs or gallops. Peripheral pulses 2+ and symmetric. No LE edema.  Resp: Speaking in full sentences, breath sounds equal and clear bilaterally. No wheezes, rales or rhonchi.  Abd: Soft, non-tender, non-distended.  No guarding or rebound.  MSK: Spine midline and non-tender. No CVAT.  Skin: No rashes, abrasions or lacerations.  Neuro: Moves all extremities symmetrically. No motor or sensory deficits

## 2024-12-24 NOTE — ED ADULT NURSE NOTE - NSFALLUNIVINTERV_ED_ALL_ED
Bed/Stretcher in lowest position, wheels locked, appropriate side rails in place/Call bell, personal items and telephone in reach/Instruct patient to call for assistance before getting out of bed/chair/stretcher/Non-slip footwear applied when patient is off stretcher/[place] to call system/Physically safe environment - no spills, clutter or unnecessary equipment/Purposeful proactive rounding/Room/bathroom lighting operational, light cord in reach

## 2024-12-24 NOTE — ED PROVIDER NOTE - NSFOLLOWUPINSTRUCTIONS_ED_ALL_ED_FT
----- Message from Emmy Godinez sent at 8/5/2019  9:43 AM CDT -----  Patient need a prescription for Eliquis 5mg 1 tablet twice a day. She uses [facility] on [address]. Her call back number is [phone].   Tricia un seguimiento con pineda médico de atención primaria la próxima semana.  [place] pineda metformina e insulina según lo recetado.  Es extremadamente importante controlar pineda diabetes de manera adecuada, ya que puede tener efectos a susi plazo en pineda shayy.  Si experimenta algún síntoma o inquietud que empeora, regrese al servicio de urgencias.

## 2024-12-24 NOTE — ED PROVIDER NOTE - CLINICAL SUMMARY MEDICAL DECISION MAKING FREE TEXT BOX
64-year-old male patient with history of diabetes, on metformin, insulin presents to the ED complaining of hyperglycemia, elevated blood sugar at home.  Also reports cough, lethargy over the past few weeks.  No fevers, no abdominal pain, no nausea or vomiting.  On exam, patient is well-appearing, vital signs stable.  Fingerstick in the .  Blood work shows negative acetone, no anion gap, labs show no acidosis.  A1c is 12.1.  Metformin given in the ED.   X-ray shows no acute infiltrates or cardiopulmonary findings. Patient has follow-up outpatient with his primary care doctor.  Educated patient that taking his medication for diabetes is extremely important.  Instructed to hydrate well at home, avoid high carbohydrate foods.  Return precautions given. all conversations held with ED  at bedside.  Patient agrees to plan of care

## 2024-12-24 NOTE — ED PROVIDER NOTE - OBJECTIVE STATEMENT
64-year-old male patient with a history of diabetes, on metformin, insulin, does not take insulin regularly presents the ED complaining of hyperglycemia.  Patient states that he checked his fingerstick at home today, noted to be over 400, prompting him to come to the ED for evaluation.  Patient states over the past few weeks he has noticed increased thirst, feels more tired than normal, also has had a nonproductive cough.  He denies any known fevers, headache, dizziness, nausea, vomiting, abdominal pain, chest pain or shortness of breath.  Patient is unsure how much insulin he is supposedly on but has an appointment with his primary care doctor on January 8.

## 2024-12-24 NOTE — ED PROVIDER NOTE - ATTENDING CONTRIBUTION TO CARE
64-year-old male patient with a history of diabetes, on metformin, insulin though self reporting intermittent noncompliance with insulin presents the ED complaining of hyperglycemia; obtained labs / cxr for eval of poss infectious source / r/o dka though patient well appearing, labs and CXR without conerning findings, urinalysis OK, stable for outpt follow up wGerardo reutrpablo precautions for any new s/sx of worsening, discussed importance of medication use.

## 2024-12-24 NOTE — ED ADULT TRIAGE NOTE - CHIEF COMPLAINT QUOTE
Pt c/o high blood sugar.  States 443 at home.  Hx of DM.  Non-compliant with finger sticks, insulin.

## 2024-12-25 LAB
CULTURE RESULTS: SIGNIFICANT CHANGE UP
SPECIMEN SOURCE: SIGNIFICANT CHANGE UP

## 2025-05-16 NOTE — ED PROVIDER NOTE - PATIENT PORTAL LINK FT
Patient unable to complete You can access the FollowMyHealth Patient Portal offered by [facility] by registering at the following website: [email or web address]. By joining [facility]’s FollowMyHealth portal, you will also be able to view your health information using other applications (apps) compatible with our system.